# Patient Record
Sex: FEMALE | Race: WHITE | NOT HISPANIC OR LATINO | Employment: OTHER | URBAN - METROPOLITAN AREA
[De-identification: names, ages, dates, MRNs, and addresses within clinical notes are randomized per-mention and may not be internally consistent; named-entity substitution may affect disease eponyms.]

---

## 2017-01-05 ENCOUNTER — OFFICE VISIT (OUTPATIENT)
Dept: OBSTETRICS AND GYNECOLOGY | Facility: CLINIC | Age: 52
End: 2017-01-05
Payer: COMMERCIAL

## 2017-01-05 ENCOUNTER — TELEPHONE (OUTPATIENT)
Dept: OBSTETRICS AND GYNECOLOGY | Facility: CLINIC | Age: 52
End: 2017-01-05

## 2017-01-05 VITALS
DIASTOLIC BLOOD PRESSURE: 80 MMHG | SYSTOLIC BLOOD PRESSURE: 120 MMHG | RESPIRATION RATE: 10 BRPM | HEIGHT: 64 IN | BODY MASS INDEX: 29.02 KG/M2 | WEIGHT: 170 LBS | HEART RATE: 82 BPM

## 2017-01-05 DIAGNOSIS — Z01.419 WELL WOMAN EXAM WITH ROUTINE GYNECOLOGICAL EXAM: Primary | ICD-10-CM

## 2017-01-05 DIAGNOSIS — N95.1 MENOPAUSAL SYMPTOMS: ICD-10-CM

## 2017-01-05 DIAGNOSIS — Z12.31 OTHER SCREENING MAMMOGRAM: ICD-10-CM

## 2017-01-05 DIAGNOSIS — Z12.11 COLON CANCER SCREENING: ICD-10-CM

## 2017-01-05 DIAGNOSIS — Z12.4 CERVICAL CANCER SCREENING: ICD-10-CM

## 2017-01-05 PROCEDURE — 99999 PR PBB SHADOW E&M-EST. PATIENT-LVL III: CPT | Mod: PBBFAC,,, | Performed by: OBSTETRICS & GYNECOLOGY

## 2017-01-05 PROCEDURE — 88175 CYTOPATH C/V AUTO FLUID REDO: CPT

## 2017-01-05 PROCEDURE — 99396 PREV VISIT EST AGE 40-64: CPT | Mod: S$GLB,,, | Performed by: OBSTETRICS & GYNECOLOGY

## 2017-01-05 PROCEDURE — 87624 HPV HI-RISK TYP POOLED RSLT: CPT

## 2017-01-05 RX ORDER — ATENOLOL 25 MG/1
25 TABLET ORAL DAILY
COMMUNITY
End: 2019-02-11

## 2017-01-05 NOTE — TELEPHONE ENCOUNTER
----- Message from Izzy Chan MD sent at 1/5/2017  9:17 AM CST -----  Please fax MMG order to [facility].

## 2017-01-05 NOTE — MR AVS SNAPSHOT
"    [facility] [place] [facility]  [address] [place] [id number]  Phone: [phone]                  Coco Roger   2017 7:30 AM   Office Visit    Description:  Female : 1965   Provider:  Izzy Chan MD   Department:  [facility] [place] [facility]           Reason for Visit     Gynecologic Exam           Diagnoses this Visit        Comments    Well woman exam with routine gynecological exam    -  Primary     Cervical cancer screening         Menopausal symptoms         Colon cancer screening         Other screening mammogram                To Do List           Goals (5 Years of Data)     None      [facility]     [facility] -  Assistance  Registered nurses in the [facility] provide clinical advisement, health education, appointment booking, and other advisory services.  Call for this free service at [phone].             Medications           Message regarding Medications     Verify the changes and/or additions to your medication regime listed below are the same as discussed with your clinician today.  If any of these changes or additions are incorrect, please notify your healthcare provider.             Verify that the below list of medications is an accurate representation of the medications you are currently taking.  If none reported, the list may be blank. If incorrect, please contact your healthcare provider. Carry this list with you in case of emergency.           Current Medications     atenolol (TENORMIN) 25 MG tablet Take 25 mg by mouth once daily.    SOY ISOFLA/BLK COHOSH/MAG BARK (ESTROVEN ORAL) Take 1 tablet by mouth once daily.           Clinical Reference Information           Vital Signs - Last Recorded  Most recent update: 2017  7:40 AM by Erika Royal MA    BP Pulse Resp Ht Wt LMP    120/80 82 10 5' 4" (1.626 m) 77.1 kg (170 lb) 2016    BMI                29.18 kg/m2          Blood Pressure          Most Recent Value    BP "  120/80      Allergies as of 1/5/2017     Bactrim [Sulfamethoxazole-trimethoprim]    Epinephrine      Immunizations Administered on Date of Encounter - 1/5/2017     None      Orders Placed During Today's Visit      Normal Orders This Visit    Case request GI: COLONOSCOPY     HPV DNA probe, amplified     Liquid-based pap smear, screening     Mammo Digital Screening Bilat with CAD     Future Labs/Procedures Expected by Expires    Mammo Digital Screening Bilat with CAD  1/5/2017 3/7/2018

## 2017-01-05 NOTE — TELEPHONE ENCOUNTER
Mammogram order faxed to [facility] at [phone] with fax confirmation received.

## 2017-01-13 LAB — HUMAN PAPILLOMAVIRUS (HPV): NOT DETECTED

## 2017-03-07 ENCOUNTER — PATIENT MESSAGE (OUTPATIENT)
Dept: OBSTETRICS AND GYNECOLOGY | Facility: CLINIC | Age: 52
End: 2017-03-07

## 2017-03-08 ENCOUNTER — PATIENT MESSAGE (OUTPATIENT)
Dept: OBSTETRICS AND GYNECOLOGY | Facility: CLINIC | Age: 52
End: 2017-03-08

## 2017-03-08 RX ORDER — MEDROXYPROGESTERONE ACETATE 5 MG/1
5 TABLET ORAL DAILY
Qty: 30 TABLET | Refills: 11 | Status: SHIPPED | OUTPATIENT
Start: 2017-03-08 | End: 2018-01-22

## 2017-03-08 RX ORDER — ESTRADIOL 1 MG/1
1 TABLET ORAL DAILY
Qty: 30 TABLET | Refills: 11 | Status: SHIPPED | OUTPATIENT
Start: 2017-03-08 | End: 2017-03-29

## 2017-03-22 ENCOUNTER — PATIENT MESSAGE (OUTPATIENT)
Dept: OBSTETRICS AND GYNECOLOGY | Facility: CLINIC | Age: 52
End: 2017-03-22

## 2017-03-24 ENCOUNTER — PATIENT MESSAGE (OUTPATIENT)
Dept: OBSTETRICS AND GYNECOLOGY | Facility: CLINIC | Age: 52
End: 2017-03-24

## 2017-03-29 ENCOUNTER — PATIENT MESSAGE (OUTPATIENT)
Dept: OBSTETRICS AND GYNECOLOGY | Facility: CLINIC | Age: 52
End: 2017-03-29

## 2017-03-29 RX ORDER — ESTRADIOL 2 MG/1
2 TABLET ORAL DAILY
Qty: 30 TABLET | Refills: 11 | Status: SHIPPED | OUTPATIENT
Start: 2017-03-29 | End: 2018-01-22 | Stop reason: SDUPTHER

## 2017-03-30 ENCOUNTER — PATIENT MESSAGE (OUTPATIENT)
Dept: OBSTETRICS AND GYNECOLOGY | Facility: CLINIC | Age: 52
End: 2017-03-30

## 2017-05-16 ENCOUNTER — PATIENT MESSAGE (OUTPATIENT)
Dept: OBSTETRICS AND GYNECOLOGY | Facility: CLINIC | Age: 52
End: 2017-05-16

## 2018-01-22 ENCOUNTER — OFFICE VISIT (OUTPATIENT)
Dept: OBSTETRICS AND GYNECOLOGY | Facility: CLINIC | Age: 53
End: 2018-01-22
Payer: COMMERCIAL

## 2018-01-22 VITALS
DIASTOLIC BLOOD PRESSURE: 80 MMHG | RESPIRATION RATE: 10 BRPM | SYSTOLIC BLOOD PRESSURE: 120 MMHG | WEIGHT: 166 LBS | HEART RATE: 88 BPM | BODY MASS INDEX: 28.34 KG/M2 | HEIGHT: 64 IN

## 2018-01-22 DIAGNOSIS — Z98.890 HISTORY OF ENDOMETRIAL ABLATION: ICD-10-CM

## 2018-01-22 DIAGNOSIS — Z79.890 HORMONE REPLACEMENT THERAPY (HRT): ICD-10-CM

## 2018-01-22 DIAGNOSIS — Z01.419 WELL WOMAN EXAM WITH ROUTINE GYNECOLOGICAL EXAM: Primary | ICD-10-CM

## 2018-01-22 DIAGNOSIS — Z12.39 SCREENING FOR BREAST CANCER: ICD-10-CM

## 2018-01-22 PROCEDURE — 99396 PREV VISIT EST AGE 40-64: CPT | Mod: S$GLB,,, | Performed by: OBSTETRICS & GYNECOLOGY

## 2018-01-22 PROCEDURE — 99999 PR PBB SHADOW E&M-EST. PATIENT-LVL III: CPT | Mod: PBBFAC,,, | Performed by: OBSTETRICS & GYNECOLOGY

## 2018-01-22 RX ORDER — MEDROXYPROGESTERONE ACETATE 2.5 MG/1
2.5 TABLET ORAL DAILY
Qty: 30 TABLET | Refills: 11 | Status: SHIPPED | OUTPATIENT
Start: 2018-01-22 | End: 2019-01-18 | Stop reason: SDUPTHER

## 2018-01-22 RX ORDER — ESTRADIOL 2 MG/1
2 TABLET ORAL DAILY
Qty: 30 TABLET | Refills: 11 | Status: SHIPPED | OUTPATIENT
Start: 2018-01-22 | End: 2019-01-08 | Stop reason: SDUPTHER

## 2018-01-22 NOTE — PROGRESS NOTES
Subjective:    Patient ID: Coco Roger is a 52 y.o. y.o. female.     Chief Complaint: Annual Well Woman Exam     History of Present Illness:  Coco presents today for Annual Well Woman exam. She describes her menses as regular every month without intermenstrual spotting.She denies pelvic pain.  She denies breast tenderness, masses, nipple discharge. She denies difficulty with urination or bowel movements. She denies menopausal symptoms such as hotflashes, vaginal dryness, and night sweats. She denies bloating, early satiety, or weight changes. She is sexually active. Contraception is by no method.    Vasomotor symptoms resolved with HRT however has bleeding while on her 10 days of provera. Has tried taking it every day with no improvement.     Menstrual History:   No LMP recorded. Patient is not currently having periods (Reason: Perimenopausal)..     OB History      Para Term  AB Living    4 3 3   1 3    SAB TAB Ectopic Multiple Live Births    1       3          The following portions of the patient's history were reviewed and updated as appropriate: allergies, current medications, past family history, past medical history, past social history, past surgical history and problem list.    ROS:   CONSTITUTIONAL: Negative for fever, chills, diaphoresis, weakness, fatigue, weight loss, weight gain  ENT: negative for sore throat, nasal congestion, nasal discharge, epistaxis, tinnitus, hearing loss  EYES: negative for blurry vision, decreased vision, loss of vision, eye pain, diplopia, photophobia, discharge  SKIN: Negative for rash, itching, hives  RESPIRATORY: negative for cough, hemoptysis, shortness of breath, pleuritic chest pain, wheezing  CARDIOVASCULAR: negative for chest pain, dyspnea on exertion, orthopnea, paroxysmal nocturnal dyspnea, edema, palpitations  BREAST: negative for breast  tenderness, breast mass, nipple discharge, or skin changes  GASTROINTESTINAL: negative for abdominal pain, flank  "pain, nausea, vomiting, diarrhea, constipation, black stool, blood in stool  GENITOURINARY: negative for abnormal vaginal bleeding, amenorrhea, decreased libido, dysuria, genital sores, hematuria, incontinence, menorrhagia, pelvic pain, urinary frequency, vaginal discharge  HEMATOLOGIC/LYMPHATIC: negative for swollen lymph nodes, bleeding, bruising  MUSCULOSKELETAL: negative for back pain, joint pain, joint stiffness, joint swelling, muscle pain, muscle weakness  NEUROLOGICAL: negative for dizzy/vertigo, headache, focal weakness, numbness/tingling, speech problems, loss of consciousness, confusion, memory loss  BEHAVORIAL/PSYCH: negative for anxiety, depression, psychosis  ENDOCRINE: negative for polydipsia/polyuria, palpitations, skin changes, temperature intolerance, unexpected weight changes  ALLERGIC/IMMUNOLOGIC: negative for urticaria, hay fever, angioedema      Objective:    Vital Signs:  Vitals:    01/22/18 1337   BP: 120/80   Pulse: 88   Resp: 10   Weight: 75.3 kg (166 lb)   Height: 5' 4" (1.626 m)         Physical Exam:  General:  alert, cooperative, appears stated age   Skin:  Skin color, texture, turgor normal. No rashes or lesions   HEENT:  conjunctivae/corneas clear. PERRL.   Neck: supple, trachea midline, no adenopathy or thyromegally   Respiratory:  clear to auscultation bilaterally   Heart:  regular rate and rhythm, S1, S2 normal, no murmur, click, rub or gallop   Breasts:  no discharge, erythema, or tenderness   Abdomen:  normal findings: bowel sounds normal, no masses palpable, no organomegaly and soft, non-tender   Pelvis: External genitalia: normal general appearance  Urinary system: urethral meatus normal, bladder nontender  Vaginal: normal mucosa without prolapse or lesions  Cervix: normal appearance  Uterus: normal size, shape, position  Adnexa: normal size, nontender bilaterally   Extremities: Normal ROM; no edema, no cyanosis   Neurologial: Normal strength and tone. No focal numbness or " weakness. Reflexes 2+ and equal.   Psychiatric: normal mood, speech, dress, and thought processes         Assessment:       Healthy female exam.     1. Well woman exam with routine gynecological exam    2. Hormone replacement therapy (HRT)    3. History of endometrial ablation    4. Screening for breast cancer          Plan:      Pap smear deferred  MMG ordered  Refill Estrace  Change Provera from 5 mg to 2.5 mg  RTC in 1 year    COUNSELING:  Coco was counseled on STD pevention, use and side-effects of various contraceptive measures, A.C.O.G. Pap guidelines and recommendations for yearly pelvic exams in addition to recommendations for monthly self breast exams; to see her PCP for other health maintenance.

## 2019-01-07 RX ORDER — MEDROXYPROGESTERONE ACETATE 2.5 MG/1
TABLET ORAL
Qty: 90 TABLET | Refills: 0 | OUTPATIENT
Start: 2019-01-07

## 2019-01-08 RX ORDER — ESTRADIOL 2 MG/1
2 TABLET ORAL DAILY
Qty: 30 TABLET | Refills: 11 | Status: SHIPPED | OUTPATIENT
Start: 2019-01-08 | End: 2019-02-11 | Stop reason: SDUPTHER

## 2019-01-08 NOTE — TELEPHONE ENCOUNTER
Refill request received from [facility] in [place] for refill on Estradiol 2 mg tablets. Last annual 1/22/18, patient is scheduled for annual on 2/11/19.

## 2019-01-18 ENCOUNTER — PATIENT MESSAGE (OUTPATIENT)
Dept: OBSTETRICS AND GYNECOLOGY | Facility: CLINIC | Age: 54
End: 2019-01-18

## 2019-01-18 RX ORDER — MEDROXYPROGESTERONE ACETATE 2.5 MG/1
2.5 TABLET ORAL DAILY
Qty: 30 TABLET | Refills: 1 | Status: SHIPPED | OUTPATIENT
Start: 2019-01-18 | End: 2019-02-11 | Stop reason: SDUPTHER

## 2019-01-18 NOTE — TELEPHONE ENCOUNTER
Coco desire refill of Provera 2.5mg Please advise. wwe 2/11/19.  There is no problem list on file for this patient.    Prior to Admission medications    Medication Sig Start Date End Date Taking? Authorizing Provider   atenolol (TENORMIN) 25 MG tablet Take 25 mg by mouth once daily.    Historical Provider, MD   estradiol (ESTRACE) 2 MG tablet Take 1 tablet (2 mg total) by mouth once daily. 1/8/19 1/8/20  Izzy Chan MD   medroxyPROGESTERone (PROVERA) 2.5 MG tablet Take 1 tablet (2.5 mg total) by mouth once daily. 1/22/18 1/22/19  Izzy Chan MD

## 2019-02-11 ENCOUNTER — OFFICE VISIT (OUTPATIENT)
Dept: OBSTETRICS AND GYNECOLOGY | Facility: CLINIC | Age: 54
End: 2019-02-11
Payer: COMMERCIAL

## 2019-02-11 VITALS
BODY MASS INDEX: 27.89 KG/M2 | DIASTOLIC BLOOD PRESSURE: 94 MMHG | WEIGHT: 163.38 LBS | HEIGHT: 64 IN | HEART RATE: 71 BPM | SYSTOLIC BLOOD PRESSURE: 142 MMHG | RESPIRATION RATE: 14 BRPM

## 2019-02-11 DIAGNOSIS — N95.1 MENOPAUSAL SYMPTOMS: ICD-10-CM

## 2019-02-11 DIAGNOSIS — Z12.4 CERVICAL CANCER SCREENING: ICD-10-CM

## 2019-02-11 DIAGNOSIS — Z12.31 SCREENING MAMMOGRAM, ENCOUNTER FOR: ICD-10-CM

## 2019-02-11 DIAGNOSIS — Z01.419 ENCOUNTER FOR GYNECOLOGICAL EXAMINATION (GENERAL) (ROUTINE) WITHOUT ABNORMAL FINDINGS: Primary | ICD-10-CM

## 2019-02-11 PROCEDURE — 88175 CYTOPATH C/V AUTO FLUID REDO: CPT

## 2019-02-11 PROCEDURE — 99396 PREV VISIT EST AGE 40-64: CPT | Mod: S$GLB,,, | Performed by: OBSTETRICS & GYNECOLOGY

## 2019-02-11 PROCEDURE — 99999 PR PBB SHADOW E&M-EST. PATIENT-LVL III: CPT | Mod: PBBFAC,,, | Performed by: OBSTETRICS & GYNECOLOGY

## 2019-02-11 PROCEDURE — 99999 PR PBB SHADOW E&M-EST. PATIENT-LVL III: ICD-10-PCS | Mod: PBBFAC,,, | Performed by: OBSTETRICS & GYNECOLOGY

## 2019-02-11 PROCEDURE — 99396 PR PREVENTIVE VISIT,EST,40-64: ICD-10-PCS | Mod: S$GLB,,, | Performed by: OBSTETRICS & GYNECOLOGY

## 2019-02-11 RX ORDER — AMOXICILLIN 500 MG
CAPSULE ORAL DAILY
COMMUNITY
End: 2022-01-20

## 2019-02-11 RX ORDER — ACETAMINOPHEN AND PHENYLEPHRINE HCL 325; 5 MG/1; MG/1
TABLET ORAL
COMMUNITY

## 2019-02-11 RX ORDER — ESTRADIOL 2 MG/1
2 TABLET ORAL DAILY
Qty: 90 TABLET | Refills: 3 | Status: SHIPPED | OUTPATIENT
Start: 2019-02-11 | End: 2020-01-21 | Stop reason: SDUPTHER

## 2019-02-11 RX ORDER — MEDROXYPROGESTERONE ACETATE 2.5 MG/1
2.5 TABLET ORAL DAILY
Qty: 90 TABLET | Refills: 3 | Status: SHIPPED | OUTPATIENT
Start: 2019-02-11 | End: 2020-01-21 | Stop reason: SDUPTHER

## 2019-02-11 RX ORDER — ATENOLOL 50 MG/1
75 TABLET ORAL
Refills: 3 | COMMUNITY
Start: 2019-01-28

## 2019-02-11 NOTE — PROGRESS NOTES
Subjective:    Patient ID: Coco Roger is a 53 y.o. y.o. female.     Chief Complaint: Annual Well Woman Exam     History of Present Illness:  Coco presents today for Annual Well Woman exam. She describes her menses as absent as of Oct 2018.She denies pelvic pain.  She denies breast tenderness, masses, nipple discharge. She denies difficulty with urination or bowel movements. She reports menopausal symptoms such as hotflashes, vaginal dryness, and night sweats are well controlled with current HRT dose. She denies bloating, early satiety, or weight changes. She is sexually active. Contraception is by bilateral tubal ligation.      Menstrual History:   No LMP recorded. Patient is perimenopausal..     OB History      Para Term  AB Living    4 3 3   1 3    SAB TAB Ectopic Multiple Live Births    1       3          The following portions of the patient's history were reviewed and updated as appropriate: allergies, current medications, past family history, past medical history, past social history, past surgical history and problem list.    ROS:   CONSTITUTIONAL: Negative for fever, chills, diaphoresis, weakness, fatigue, weight loss, weight gain  ENT: negative for sore throat, nasal congestion, nasal discharge, epistaxis, tinnitus, hearing loss  EYES: negative for blurry vision, decreased vision, loss of vision, eye pain, diplopia, photophobia, discharge  SKIN: Negative for rash, itching, hives  RESPIRATORY: negative for cough, hemoptysis, shortness of breath, pleuritic chest pain, wheezing  CARDIOVASCULAR: negative for chest pain, dyspnea on exertion, orthopnea, paroxysmal nocturnal dyspnea, edema, palpitations  BREAST: negative for breast  tenderness, breast mass, nipple discharge, or skin changes  GASTROINTESTINAL: negative for abdominal pain, flank pain, nausea, vomiting, diarrhea, constipation, black stool, blood in stool  GENITOURINARY: negative for abnormal vaginal bleeding, amenorrhea, decreased  libido, dysuria, genital sores, hematuria, incontinence, menorrhagia, pelvic pain, urinary frequency, vaginal discharge  HEMATOLOGIC/LYMPHATIC: negative for swollen lymph nodes, bleeding, bruising  MUSCULOSKELETAL: negative for back pain, joint pain, joint stiffness, joint swelling, muscle pain, muscle weakness  NEUROLOGICAL: negative for dizzy/vertigo, headache, focal weakness, numbness/tingling, speech problems, loss of consciousness, confusion, memory loss  BEHAVORIAL/PSYCH: negative for anxiety, depression, psychosis  ENDOCRINE: hair loss, negative for polydipsia/polyuria, palpitations, skin changes, temperature intolerance, unexpected weight changes      Objective:    Vital Signs:  Vitals:    02/11/19 1212   BP: (!) 142/94   Pulse: 71   Resp: 14       Physical Exam:  General:  alert, cooperative, no distress   Skin:  Skin color, texture, turgor normal. No rashes or lesions   HEENT:  extra ocular movement intact, sclera clear, anicteric   Neck: supple, trachea midline, no adenopathy or thyromegally   Respiratory:  Normal effort   Breasts:  no discharge, erythema, tenderness, or palpable masses; no axillary lymphadenopathy   Abdomen:  soft, nontender, no palpable masses   Pelvis: External genitalia: normal general appearance  Urinary system: urethral meatus normal, bladder nontender  Vaginal: normal mucosa without prolapse or lesions  Cervix: normal appearance  Uterus: normal size, shape, position  Adnexa: normal size, nontender bilaterally   Extremities: Normal ROM; no edema, no cyanosis   Neurologial: Normal strength and tone. No focal numbness or weakness.   Psychiatric: normal mood, speech, dress, and thought processes         Assessment:       Healthy female exam.     1. Encounter for gynecological examination (general) (routine) without abnormal findings    2. Menopausal symptoms    3. Cervical cancer screening    4. Screening mammogram, encounter for          Plan:      Encounter for gynecological  examination (general) (routine) without abnormal findings    Menopausal symptoms  -     estradiol (ESTRACE) 2 MG tablet; Take 1 tablet (2 mg total) by mouth once daily.  Dispense: 90 tablet; Refill: 3  -     medroxyPROGESTERone (PROVERA) 2.5 MG tablet; Take 1 tablet (2.5 mg total) by mouth once daily.  Dispense: 90 tablet; Refill: 3    Cervical cancer screening  -     Liquid-based pap smear, screening    Screening mammogram, encounter for  -     Mammo Digital Screening Bilat w/ Dago; Future; Expected date: 02/11/2019        Patient's insurance will only cover annual examination if pap smear is done with GYN exam.  Discussed ASCCP guidelines.  Will do pap smear today per discussion with patient and per insurance rules and regulations despite guidelines.    COUNSELING:  Coco was counseled on A.C.O.G. Pap guidelines and recommendations for yearly pelvic exams in addition to recommendations for monthly self breast exams; to see her PCP for other health maintenance.

## 2020-01-21 DIAGNOSIS — Z12.31 BREAST CANCER SCREENING BY MAMMOGRAM: Primary | ICD-10-CM

## 2020-01-21 DIAGNOSIS — N95.1 MENOPAUSAL SYMPTOMS: ICD-10-CM

## 2020-01-21 NOTE — TELEPHONE ENCOUNTER
----- Message from Erika Royal MA sent at 1/21/2020 11:37 AM CST -----  Contact: self      ----- Message -----  From: Radha Greenfield MA  Sent: 1/21/2020  11:21 AM CST  To: Dustin Magana Staff    Coco Roger  MRN: [id number]  Home Phone      [phone]  Work Phone      Not on file.  Mobile          [phone]    Patient Care Team:  Tacho Paul MD as PCP - General (Internal Medicine)  Izzy Chan MD (Obstetrics and Gynecology)  OB? No  What phone number can you be reached at?  [phone]  Message: Needs refill on hormone medication.  Annual is scheduled for 04/03/20. Also needs mammo orders sent to [facility].     Pharmacy:  [facility] on [address]

## 2020-01-21 NOTE — TELEPHONE ENCOUNTER
Coco desire refill of Estrace and Provera to last until scheduled annual on 4/3/20. Last annual 2/11/19. Mammogram orders placed and faxed to [facility] with fax confirmation.  Patient Active Problem List   Diagnosis    Perimenopausal vasomotor symptoms     Prior to Admission medications    Medication Sig Start Date End Date Taking? Authorizing Provider   atenolol (TENORMIN) 50 MG tablet TK 1 T PO  QD 1/28/19   Historical Provider, MD   biotin 10,000 mcg Cap Take by mouth.    Historical Provider, MD   estradiol (ESTRACE) 2 MG tablet Take 1 tablet (2 mg total) by mouth once daily. 2/11/19 2/11/20  Ashlee Nino MD   fish oil-omega-3 fatty acids 300-1,000 mg capsule Take by mouth once daily.    Historical Provider, MD   medroxyPROGESTERone (PROVERA) 2.5 MG tablet Take 1 tablet (2.5 mg total) by mouth once daily. 2/11/19 2/11/20  Ashlee Nino MD   mv-mn/folic acid/calcium/vit K (ONE-A-DAY WOMEN'S 50 PLUS ORAL) Take by mouth.    Historical Provider, MD

## 2020-01-22 RX ORDER — ESTRADIOL 2 MG/1
2 TABLET ORAL DAILY
Qty: 90 TABLET | Refills: 0 | Status: SHIPPED | OUTPATIENT
Start: 2020-01-22 | End: 2020-02-04

## 2020-01-22 RX ORDER — MEDROXYPROGESTERONE ACETATE 2.5 MG/1
2.5 TABLET ORAL DAILY
Qty: 90 TABLET | Refills: 0 | Status: SHIPPED | OUTPATIENT
Start: 2020-01-22 | End: 2020-02-04

## 2020-02-02 DIAGNOSIS — N95.1 MENOPAUSAL SYMPTOMS: ICD-10-CM

## 2020-02-04 RX ORDER — MEDROXYPROGESTERONE ACETATE 2.5 MG/1
TABLET ORAL
Qty: 90 TABLET | Refills: 0 | Status: SHIPPED | OUTPATIENT
Start: 2020-02-04 | End: 2020-07-07 | Stop reason: SDUPTHER

## 2020-02-04 RX ORDER — ESTRADIOL 2 MG/1
TABLET ORAL
Qty: 90 TABLET | Refills: 0 | Status: SHIPPED | OUTPATIENT
Start: 2020-02-04 | End: 2020-07-07 | Stop reason: SDUPTHER

## 2020-07-07 ENCOUNTER — OFFICE VISIT (OUTPATIENT)
Dept: OBSTETRICS AND GYNECOLOGY | Facility: CLINIC | Age: 55
End: 2020-07-07
Payer: COMMERCIAL

## 2020-07-07 VITALS — HEART RATE: 66 BPM | BODY MASS INDEX: 28.62 KG/M2 | WEIGHT: 167.63 LBS | RESPIRATION RATE: 16 BRPM | HEIGHT: 64 IN

## 2020-07-07 DIAGNOSIS — Z79.890 HORMONE REPLACEMENT THERAPY (HRT): ICD-10-CM

## 2020-07-07 DIAGNOSIS — Z01.419 ENCOUNTER FOR GYNECOLOGICAL EXAMINATION (GENERAL) (ROUTINE) WITHOUT ABNORMAL FINDINGS: Primary | ICD-10-CM

## 2020-07-07 DIAGNOSIS — Z12.39 SCREENING FOR BREAST CANCER: ICD-10-CM

## 2020-07-07 DIAGNOSIS — N95.1 MENOPAUSAL SYMPTOMS: ICD-10-CM

## 2020-07-07 DIAGNOSIS — Z12.4 CERVICAL CANCER SCREENING: ICD-10-CM

## 2020-07-07 PROCEDURE — 99999 PR PBB SHADOW E&M-EST. PATIENT-LVL III: CPT | Mod: PBBFAC,,, | Performed by: OBSTETRICS & GYNECOLOGY

## 2020-07-07 PROCEDURE — 87624 HPV HI-RISK TYP POOLED RSLT: CPT

## 2020-07-07 PROCEDURE — 99396 PR PREVENTIVE VISIT,EST,40-64: ICD-10-PCS | Mod: S$GLB,,, | Performed by: OBSTETRICS & GYNECOLOGY

## 2020-07-07 PROCEDURE — 99999 PR PBB SHADOW E&M-EST. PATIENT-LVL III: ICD-10-PCS | Mod: PBBFAC,,, | Performed by: OBSTETRICS & GYNECOLOGY

## 2020-07-07 PROCEDURE — 99396 PREV VISIT EST AGE 40-64: CPT | Mod: S$GLB,,, | Performed by: OBSTETRICS & GYNECOLOGY

## 2020-07-07 PROCEDURE — 88175 CYTOPATH C/V AUTO FLUID REDO: CPT

## 2020-07-07 RX ORDER — MEDROXYPROGESTERONE ACETATE 2.5 MG/1
TABLET ORAL
Qty: 90 TABLET | Refills: 3 | Status: SHIPPED | OUTPATIENT
Start: 2020-07-07 | End: 2021-01-27

## 2020-07-07 RX ORDER — ESTRADIOL 2 MG/1
TABLET ORAL
Qty: 90 TABLET | Refills: 3 | Status: SHIPPED | OUTPATIENT
Start: 2020-07-07 | End: 2021-07-27

## 2020-07-07 NOTE — PROGRESS NOTES
"  Subjective:    Patient ID: Coco Roger is a 55 y.o. y.o. female.     Chief Complaint: Annual Well Woman Exam     History of Present Illness:  Coco presents today for Annual Well Woman exam. She describes her menses as absent.She denies pelvic pain.  She denies breast tenderness, masses, nipple discharge. She denies difficulty with urination or bowel movements. She denies menopausal symptoms such as hotflashes, vaginal dryness, and night sweats; reports HRT working well. She denies bloating, early satiety, or weight changes. She is sexually active. Contraception is by no method.    The following portions of the patient's history were reviewed and updated as appropriate: allergies, current medications, past family history, past medical history, past social history, past surgical history and problem list.      Menstrual History:   No LMP recorded. Patient is perimenopausal..     OB History        4    Para   3    Term   3            AB   1    Living   3       SAB   1    TAB        Ectopic        Multiple        Live Births   3                 The following portions of the patient's history were reviewed and updated as appropriate: allergies, current medications, past family history, past medical history, past social history, past surgical history and problem list.        ROS:     Review of Systems    Objective:    Vital Signs:  Vitals:    20 0910   Pulse: 66   Resp: 16   Weight: 76 kg (167 lb 9.6 oz)   Height: 5' 4" (1.626 m)         Physical Exam:  General:  alert, cooperative, appears stated age   Skin:  Skin color, texture, turgor normal. No rashes or lesions   HEENT:  conjunctivae/corneas clear. PERRL.   Neck: supple, trachea midline, no adenopathy or thyromegally   Respiratory:  clear to auscultation bilaterally   Heart:  regular rate and rhythm, S1, S2 normal, no murmur, click, rub or gallop   Breasts:  no discharge, erythema, or tenderness   Abdomen:  normal findings: bowel sounds normal, " no masses palpable, no organomegaly and soft, non-tender   Pelvis: External genitalia: normal general appearance  Urinary system: urethral meatus normal, bladder nontender  Vaginal: normal mucosa without prolapse or lesions  Cervix: normal appearance  Uterus: normal size, shape, position  Adnexa: normal size, nontender bilaterally   Extremities: Normal ROM; no edema, no cyanosis   Neurologial: Normal strength and tone. No focal numbness or weakness. Reflexes 2+ and equal.   Psychiatric: normal mood, speech, dress, and thought processes         Assessment:       Healthy female exam.     1. Encounter for gynecological examination (general) (routine) without abnormal findings    2. Cervical cancer screening    3. Screening for breast cancer    4. Hormone replacement therapy (HRT)    5. Menopausal symptoms          Plan:       Thin prep Pap smear.    HPV cotesting  MMG up to date  Refill HRT  Discussed follow up with PCP for BP control  RTC in 1 year    COUNSELING:  Coco was counseled on STD pevention, use and side-effects of various contraceptive measures, A.C.O.G. Pap guidelines and recommendations for yearly pelvic exams in addition to recommendations for monthly self breast exams; to see her PCP for other health maintenance.

## 2020-07-13 LAB
FINAL PATHOLOGIC DIAGNOSIS: NORMAL
HPV HR 12 DNA SPEC QL NAA+PROBE: POSITIVE
HPV16 AG SPEC QL: NEGATIVE
HPV18 DNA SPEC QL NAA+PROBE: NEGATIVE
Lab: NORMAL

## 2021-05-06 ENCOUNTER — PATIENT MESSAGE (OUTPATIENT)
Dept: RESEARCH | Facility: HOSPITAL | Age: 56
End: 2021-05-06

## 2021-07-05 ENCOUNTER — IMMUNIZATION (OUTPATIENT)
Dept: PRIMARY CARE CLINIC | Facility: CLINIC | Age: 56
End: 2021-07-05

## 2021-07-05 DIAGNOSIS — Z23 NEED FOR VACCINATION: Primary | ICD-10-CM

## 2021-07-05 PROCEDURE — 0001A COVID-19, MRNA, LNP-S, PF, 30 MCG/0.3 ML DOSE VACCINE: CPT | Mod: CV19,S$GLB,, | Performed by: INTERNAL MEDICINE

## 2021-07-05 PROCEDURE — 91300 COVID-19, MRNA, LNP-S, PF, 30 MCG/0.3 ML DOSE VACCINE: ICD-10-PCS | Mod: S$GLB,,, | Performed by: INTERNAL MEDICINE

## 2021-07-05 PROCEDURE — 91300 COVID-19, MRNA, LNP-S, PF, 30 MCG/0.3 ML DOSE VACCINE: CPT | Mod: S$GLB,,, | Performed by: INTERNAL MEDICINE

## 2021-07-05 PROCEDURE — 0001A COVID-19, MRNA, LNP-S, PF, 30 MCG/0.3 ML DOSE VACCINE: ICD-10-PCS | Mod: CV19,S$GLB,, | Performed by: INTERNAL MEDICINE

## 2021-07-26 DIAGNOSIS — N95.1 MENOPAUSAL SYMPTOMS: ICD-10-CM

## 2021-07-26 DIAGNOSIS — Z79.890 HORMONE REPLACEMENT THERAPY (HRT): ICD-10-CM

## 2021-07-27 RX ORDER — ESTRADIOL 2 MG/1
TABLET ORAL
Qty: 90 TABLET | Refills: 0 | Status: SHIPPED | OUTPATIENT
Start: 2021-07-27 | End: 2021-10-26

## 2021-11-11 ENCOUNTER — PATIENT MESSAGE (OUTPATIENT)
Dept: OBSTETRICS AND GYNECOLOGY | Facility: CLINIC | Age: 56
End: 2021-11-11
Payer: COMMERCIAL

## 2021-11-11 DIAGNOSIS — N95.1 MENOPAUSAL SYMPTOMS: ICD-10-CM

## 2021-11-11 DIAGNOSIS — Z79.890 HORMONE REPLACEMENT THERAPY (HRT): ICD-10-CM

## 2021-11-12 RX ORDER — ESTRADIOL 2 MG/1
TABLET ORAL
Qty: 90 TABLET | Refills: 0 | Status: SHIPPED | OUTPATIENT
Start: 2021-11-12 | End: 2022-01-20 | Stop reason: SDUPTHER

## 2021-11-12 RX ORDER — MEDROXYPROGESTERONE ACETATE 2.5 MG/1
2.5 TABLET ORAL DAILY
Qty: 90 TABLET | Refills: 0 | Status: SHIPPED | OUTPATIENT
Start: 2021-11-12 | End: 2022-01-20 | Stop reason: SDUPTHER

## 2022-01-20 ENCOUNTER — OFFICE VISIT (OUTPATIENT)
Dept: OBSTETRICS AND GYNECOLOGY | Facility: CLINIC | Age: 57
End: 2022-01-20
Payer: COMMERCIAL

## 2022-01-20 VITALS
DIASTOLIC BLOOD PRESSURE: 82 MMHG | BODY MASS INDEX: 27.11 KG/M2 | HEIGHT: 64 IN | RESPIRATION RATE: 15 BRPM | SYSTOLIC BLOOD PRESSURE: 140 MMHG | HEART RATE: 91 BPM | WEIGHT: 158.81 LBS

## 2022-01-20 DIAGNOSIS — Z12.4 CERVICAL CANCER SCREENING: ICD-10-CM

## 2022-01-20 DIAGNOSIS — Z79.890 HORMONE REPLACEMENT THERAPY (HRT): ICD-10-CM

## 2022-01-20 DIAGNOSIS — Z12.31 ENCOUNTER FOR SCREENING MAMMOGRAM FOR MALIGNANT NEOPLASM OF BREAST: ICD-10-CM

## 2022-01-20 DIAGNOSIS — N95.1 MENOPAUSAL SYMPTOMS: ICD-10-CM

## 2022-01-20 DIAGNOSIS — Z01.419 WELL WOMAN EXAM WITH ROUTINE GYNECOLOGICAL EXAM: Primary | ICD-10-CM

## 2022-01-20 PROCEDURE — 87624 HPV HI-RISK TYP POOLED RSLT: CPT | Performed by: OBSTETRICS & GYNECOLOGY

## 2022-01-20 PROCEDURE — 99396 PREV VISIT EST AGE 40-64: CPT | Mod: S$GLB,,, | Performed by: OBSTETRICS & GYNECOLOGY

## 2022-01-20 PROCEDURE — 99999 PR PBB SHADOW E&M-EST. PATIENT-LVL III: CPT | Mod: PBBFAC,,, | Performed by: OBSTETRICS & GYNECOLOGY

## 2022-01-20 PROCEDURE — 3079F DIAST BP 80-89 MM HG: CPT | Mod: CPTII,S$GLB,, | Performed by: OBSTETRICS & GYNECOLOGY

## 2022-01-20 PROCEDURE — 99999 PR PBB SHADOW E&M-EST. PATIENT-LVL III: ICD-10-PCS | Mod: PBBFAC,,, | Performed by: OBSTETRICS & GYNECOLOGY

## 2022-01-20 PROCEDURE — 88175 CYTOPATH C/V AUTO FLUID REDO: CPT | Performed by: OBSTETRICS & GYNECOLOGY

## 2022-01-20 PROCEDURE — 3077F SYST BP >= 140 MM HG: CPT | Mod: CPTII,S$GLB,, | Performed by: OBSTETRICS & GYNECOLOGY

## 2022-01-20 PROCEDURE — 3008F BODY MASS INDEX DOCD: CPT | Mod: CPTII,S$GLB,, | Performed by: OBSTETRICS & GYNECOLOGY

## 2022-01-20 PROCEDURE — 3077F PR MOST RECENT SYSTOLIC BLOOD PRESSURE >= 140 MM HG: ICD-10-PCS | Mod: CPTII,S$GLB,, | Performed by: OBSTETRICS & GYNECOLOGY

## 2022-01-20 PROCEDURE — 99396 PR PREVENTIVE VISIT,EST,40-64: ICD-10-PCS | Mod: S$GLB,,, | Performed by: OBSTETRICS & GYNECOLOGY

## 2022-01-20 PROCEDURE — 3079F PR MOST RECENT DIASTOLIC BLOOD PRESSURE 80-89 MM HG: ICD-10-PCS | Mod: CPTII,S$GLB,, | Performed by: OBSTETRICS & GYNECOLOGY

## 2022-01-20 PROCEDURE — 3008F PR BODY MASS INDEX (BMI) DOCUMENTED: ICD-10-PCS | Mod: CPTII,S$GLB,, | Performed by: OBSTETRICS & GYNECOLOGY

## 2022-01-20 RX ORDER — ESTRADIOL 2 MG/1
TABLET ORAL
Qty: 90 TABLET | Refills: 3 | Status: SHIPPED | OUTPATIENT
Start: 2022-01-20 | End: 2022-01-25

## 2022-01-20 RX ORDER — HYDROCHLOROTHIAZIDE 25 MG/1
25 TABLET ORAL DAILY
Qty: 90 TABLET | Refills: 3 | Status: SHIPPED | OUTPATIENT
Start: 2022-01-20

## 2022-01-20 RX ORDER — HYDROCHLOROTHIAZIDE 25 MG/1
25 TABLET ORAL DAILY
COMMUNITY
End: 2022-01-20 | Stop reason: SDUPTHER

## 2022-01-20 RX ORDER — MEDROXYPROGESTERONE ACETATE 2.5 MG/1
2.5 TABLET ORAL DAILY
Qty: 90 TABLET | Refills: 3 | Status: SHIPPED | OUTPATIENT
Start: 2022-01-20 | End: 2022-01-25

## 2022-01-20 NOTE — PROGRESS NOTES
Subjective:    Patient ID: Coco Roger is a 56 y.o. y.o. female.     Chief Complaint: Annual Well Woman Exam     History of Present Illness:  Coco presents today for Annual Well Woman exam. She describes her menses as absent.She denies pelvic pain.  She denies breast tenderness, masses, nipple discharge. She denies difficulty with urination or bowel movements. She denies menopausal symptoms such as hotflashes, vaginal dryness, and night sweats; reports HRT working well. She denies bloating, early satiety, or weight changes. She is sexually active. Contraception is by no method.    The following portions of the patient's history were reviewed and updated as appropriate: allergies, current medications, past family history, past medical history, past social history, past surgical history and problem list.      Menstrual History:   Patient's last menstrual period was 2016..     OB History        4    Para   3    Term   3            AB   1    Living   3       SAB   1    IAB        Ectopic        Multiple        Live Births   3                 The following portions of the patient's history were reviewed and updated as appropriate: allergies, current medications, past family history, past medical history, past social history, past surgical history and problem list.        ROS:     Review of Systems   Constitutional: Negative for activity change, appetite change, chills, diaphoresis, fatigue, fever and unexpected weight change.   HENT: Negative for mouth sores and tinnitus.    Eyes: Negative for discharge and visual disturbance.   Respiratory: Negative for cough, shortness of breath and wheezing.    Cardiovascular: Negative for chest pain, palpitations and leg swelling.   Gastrointestinal: Negative for abdominal pain, bloating, blood in stool, constipation, diarrhea, nausea and vomiting.   Endocrine: Negative for diabetes, hair loss, hot flashes, hyperthyroidism and hypothyroidism.   Genitourinary:  "Negative for dysuria, flank pain, frequency, genital sores, hematuria, urgency, vaginal bleeding, vaginal discharge, vaginal pain, postcoital bleeding and vaginal odor.   Musculoskeletal: Negative for back pain, joint swelling and myalgias.   Integumentary:  Negative for rash, acne, breast mass, nipple discharge and breast skin changes.   Neurological: Negative for seizures, syncope, numbness and headaches.   Hematological: Negative for adenopathy. Does not bruise/bleed easily.   Psychiatric/Behavioral: Negative for depression and sleep disturbance. The patient is not nervous/anxious.    Breast: Negative for mass, mastodynia, nipple discharge and skin changes      Objective:    Vital Signs:  Vitals:    01/20/22 0821   BP: (!) 140/82   Pulse: 91   Resp: 15   Weight: 72 kg (158 lb 12.8 oz)   Height: 5' 4" (1.626 m)         Physical Exam:  General:  alert, cooperative, appears stated age   Skin:  Skin color, texture, turgor normal. No rashes or lesions   HEENT:  conjunctivae/corneas clear. PERRL.   Neck: supple, trachea midline, no adenopathy or thyromegally   Respiratory:  clear to auscultation bilaterally   Heart:  regular rate and rhythm, S1, S2 normal, no murmur, click, rub or gallop   Breasts:  no discharge, erythema, or tenderness   Abdomen:  normal findings: bowel sounds normal, no masses palpable, no organomegaly and soft, non-tender   Pelvis: External genitalia: normal general appearance  Urinary system: urethral meatus normal, bladder nontender  Vaginal: normal mucosa without prolapse or lesions  Cervix: normal appearance  Uterus: normal size, shape, position  Adnexa: normal size, nontender bilaterally   Extremities: Normal ROM; no edema, no cyanosis   Neurologial: Normal strength and tone. No focal numbness or weakness. Reflexes 2+ and equal.   Psychiatric: normal mood, speech, dress, and thought processes         Assessment:       Healthy female exam.     1. Well woman exam with routine gynecological exam  "   2. Hormone replacement therapy (HRT)    3. Encounter for screening mammogram for malignant neoplasm of breast    4. Cervical cancer screening    5. Menopausal symptoms          Plan:       Thin prep Pap smear.    HPV cotesting  MMG ordered  Refill HRT  RTC in 1 year    COUNSELING:  Coco was counseled on STD pevention, use and side-effects of various contraceptive measures, A.C.O.G. Pap guidelines and recommendations for yearly pelvic exams in addition to recommendations for monthly self breast exams; to see her PCP for other health maintenance.

## 2022-02-15 ENCOUNTER — TELEPHONE (OUTPATIENT)
Dept: OBSTETRICS AND GYNECOLOGY | Facility: CLINIC | Age: 57
End: 2022-02-15
Payer: COMMERCIAL

## 2022-02-15 DIAGNOSIS — R92.8 ABNORMAL MAMMOGRAM OF RIGHT BREAST: ICD-10-CM

## 2022-02-15 DIAGNOSIS — R92.8 ABNORMAL ULTRASOUND OF BREAST: Primary | ICD-10-CM

## 2022-02-15 NOTE — TELEPHONE ENCOUNTER
Pt was notified and stated that she will call to schedule her test at [facility].

## 2022-02-15 NOTE — TELEPHONE ENCOUNTER
Pt called stating she received her mammogram results in her Mychart. Pt is requesting results and to see what are her next steps. Please advise

## 2022-02-15 NOTE — TELEPHONE ENCOUNTER
Usually the radiology dept calls the patient to schedule the additional images. If they have not contacted her, please schedule a right dx mmg and right breast u/s.

## 2022-02-15 NOTE — TELEPHONE ENCOUNTER
----- Message from Chino Szymanski sent at 2/15/2022 11:50 AM CST -----  Contact: self  Coco Roger  MRN: [id number]  Home Phone      [phone]  Work Phone      Not on file.  Mobile          [phone]    Patient Care Team:  Tacho Paul MD as PCP - General (Internal Medicine)  Izzy Chan MD (Obstetrics and Gynecology)  OB? No  What phone number can you be reached at? [phone]  Message:   Patient is stating she is wanting her mammo results and wanting to know what she is needing to do next. Please return call.

## 2022-11-10 NOTE — PROGRESS NOTES
Subjective:    Patient ID: Coco Roger is a 51 y.o. y.o. female.     Chief Complaint: Annual Well Woman Exam     History of Present Illness:  Coco presents today for Annual Well Woman exam. She describes her menses as absent since Feb 2016.She denies pelvic pain.  She denies breast tenderness, masses, nipple discharge. She denies difficulty with urination or bowel movements. She admits to menopausal symptoms such as hotflashes, vaginal dryness, and night sweats. She denies bloating, early satiety, or weight changes. She is sexually active. Contraception is by no method.    Patient reports she is now experiencing fatigue, hot flashes, mood swings, weight gain, decreased libido, and joint pains. Discussed menopausal symptoms and treatment options in detail. Currently taking an OTC product which contains soy and black cohosh and reports improvement in hotflashes.     A full discussion of the benefit-risk ratio of hormonal replacement therapy was carried out. Improvement in vasomotor and other climacteric symptoms were discussed, including possible improvements in sleep and mood. Reduction of risk for osteoporosis was explained. We discussed the study data showing increased risk of thrombo-embolic events such as myocardial infarction, stroke and also possibly breast cancer with estrogen replacement, and how this might affect her. The range of side effects such as breast tenderness, weight gain and including possible increases in lifetime risk of breast cancer and possible thrombotic complications was discussed. We also discussed ACOG's recommendation to use hormone replacement therapy for the relief of hot flashes alone and to be on the lowest dose possible for the shortest amount of time.  Alternative such as herbal and soy-based products were reviewed as well as behavioral modifications and non hormonal prescription medications. All of her questions about this therapy were answered.      Menstrual History:    Patient's last menstrual period was 2016..     OB History      Para Term  AB TAB SAB Ectopic Multiple Living    4 3 3  1  1   3          The following portions of the patient's history were reviewed and updated as appropriate: allergies, current medications, past family history, past medical history, past social history, past surgical history and problem list.    ROS:   CONSTITUTIONAL: Negative for fever, chills, diaphoresis, weakness, fatigue, weight loss, weight gain  ENT: negative for sore throat, nasal congestion, nasal discharge, epistaxis, tinnitus, hearing loss  EYES: negative for blurry vision, decreased vision, loss of vision, eye pain, diplopia, photophobia, discharge  SKIN: Negative for rash, itching, hives  RESPIRATORY: negative for cough, hemoptysis, shortness of breath, pleuritic chest pain, wheezing  CARDIOVASCULAR: negative for chest pain, dyspnea on exertion, orthopnea, paroxysmal nocturnal dyspnea, edema, palpitations  BREAST: negative for breast  tenderness, breast mass, nipple discharge, or skin changes  GASTROINTESTINAL: negative for abdominal pain, flank pain, nausea, vomiting, diarrhea, constipation, black stool, blood in stool  GENITOURINARY: negative for abnormal vaginal bleeding, amenorrhea, decreased libido, dysuria, genital sores, hematuria, incontinence, menorrhagia, pelvic pain, urinary frequency, vaginal discharge  HEMATOLOGIC/LYMPHATIC: negative for swollen lymph nodes, bleeding, bruising  MUSCULOSKELETAL: negative for back pain, joint pain, joint stiffness, joint swelling, muscle pain, muscle weakness  NEUROLOGICAL: negative for dizzy/vertigo, headache, focal weakness, numbness/tingling, speech problems, loss of consciousness, confusion, memory loss  BEHAVORIAL/PSYCH: negative for anxiety, depression, psychosis  ENDOCRINE: negative for polydipsia/polyuria, palpitations, skin changes, temperature intolerance, unexpected weight changes  ALLERGIC/IMMUNOLOGIC:  "negative for urticaria, hay fever, angioedema      Objective:    Vital Signs:  Vitals:    01/05/17 0738   BP: 120/80   Pulse: 82   Resp: 10   Weight: 77.1 kg (170 lb)   Height: 5' 4" (1.626 m)         Physical Exam:  General:  alert, cooperative, appears stated age   Skin:  Skin color, texture, turgor normal. No rashes or lesions   HEENT:  conjunctivae/corneas clear. PERRL.   Neck: supple, trachea midline, no adenopathy or thyromegally   Respiratory:  clear to auscultation bilaterally   Heart:  regular rate and rhythm, S1, S2 normal, no murmur, click, rub or gallop   Breasts:  no discharge, erythema, or tenderness   Abdomen:  normal findings: bowel sounds normal, no masses palpable, no organomegaly and soft, non-tender   Pelvis: External genitalia: normal general appearance  Urinary system: urethral meatus normal, bladder nontender  Vaginal: normal mucosa without prolapse or lesions  Cervix: normal appearance  Uterus: normal size, shape, position  Adnexa: normal size, nontender bilaterally   Extremities: Normal ROM; no edema, no cyanosis   Neurologial: Normal strength and tone. No focal numbness or weakness. Reflexes 2+ and equal.   Psychiatric: normal mood, speech, dress, and thought processes         Assessment:       Healthy female exam.     1. Well woman exam with routine gynecological exam    2. Cervical cancer screening    3. Menopausal symptoms    4. Colon cancer screening    5. Other screening mammogram          Plan:       Thin prep Pap smear.    HPV cotesting  Behavioral modifications/OTC supplements; undecided on HRT at this time  MMG ordered; will preform at [facility] [place]  Colonoscopy ordered   RTC in 1 year    COUNSELING:  Coco was counseled on STD pevention, use and side-effects of various contraceptive measures, A.C.O.G. Pap guidelines and recommendations for yearly pelvic exams in addition to recommendations for monthly self breast exams; to see her PCP for other health maintenance.   " No difficulties

## 2023-01-30 DIAGNOSIS — Z79.890 HORMONE REPLACEMENT THERAPY (HRT): ICD-10-CM

## 2023-01-30 DIAGNOSIS — N95.1 MENOPAUSAL SYMPTOMS: ICD-10-CM

## 2023-01-30 RX ORDER — ESTRADIOL 2 MG/1
TABLET ORAL
Qty: 90 TABLET | Refills: 0 | Status: SHIPPED | OUTPATIENT
Start: 2023-01-30 | End: 2023-04-21

## 2023-01-30 RX ORDER — MEDROXYPROGESTERONE ACETATE 2.5 MG/1
2.5 TABLET ORAL DAILY
Qty: 90 TABLET | Refills: 0 | Status: SHIPPED | OUTPATIENT
Start: 2023-01-30 | End: 2023-04-21

## 2023-01-30 NOTE — TELEPHONE ENCOUNTER
----- Message from Jessica Jaramillo sent at 1/30/2023  9:31 AM CST -----  Contact: self  Coco Roger  MRN: [id number]  Home Phone      [phone]  Work Phone      Not on file.  Mobile          [phone]    Patient Care Team:  Tacho Paul MD as PCP - General (Internal Medicine)  Izzy Chan MD (Obstetrics and Gynecology)  OB? No  What phone number can you be reached at? [phone]  Message: patient needs a refill on medroxyprogefterone. Annual is scheduled  Pharmacy [facility]

## 2023-01-30 NOTE — TELEPHONE ENCOUNTER
Pt was called and she desires refill of medroxyprogesterone and estradiol until annual scheduled with Dr. Arango on 4/26/23.

## 2023-04-26 ENCOUNTER — OFFICE VISIT (OUTPATIENT)
Dept: OBSTETRICS AND GYNECOLOGY | Facility: CLINIC | Age: 58
End: 2023-04-26
Payer: COMMERCIAL

## 2023-04-26 VITALS
SYSTOLIC BLOOD PRESSURE: 122 MMHG | WEIGHT: 168.31 LBS | DIASTOLIC BLOOD PRESSURE: 82 MMHG | OXYGEN SATURATION: 98 % | RESPIRATION RATE: 18 BRPM | BODY MASS INDEX: 28.73 KG/M2 | HEART RATE: 74 BPM | HEIGHT: 64 IN

## 2023-04-26 DIAGNOSIS — Z01.419 WELL WOMAN EXAM WITH ROUTINE GYNECOLOGICAL EXAM: Primary | ICD-10-CM

## 2023-04-26 DIAGNOSIS — N95.1 MENOPAUSAL SYMPTOMS: ICD-10-CM

## 2023-04-26 DIAGNOSIS — Z79.890 HORMONE REPLACEMENT THERAPY (HRT): ICD-10-CM

## 2023-04-26 DIAGNOSIS — Z12.31 BREAST CANCER SCREENING BY MAMMOGRAM: ICD-10-CM

## 2023-04-26 PROCEDURE — 1160F RVW MEDS BY RX/DR IN RCRD: CPT | Mod: CPTII,S$GLB,, | Performed by: OBSTETRICS & GYNECOLOGY

## 2023-04-26 PROCEDURE — 3079F PR MOST RECENT DIASTOLIC BLOOD PRESSURE 80-89 MM HG: ICD-10-PCS | Mod: CPTII,S$GLB,, | Performed by: OBSTETRICS & GYNECOLOGY

## 2023-04-26 PROCEDURE — 99396 PR PREVENTIVE VISIT,EST,40-64: ICD-10-PCS | Mod: S$GLB,,, | Performed by: OBSTETRICS & GYNECOLOGY

## 2023-04-26 PROCEDURE — 3079F DIAST BP 80-89 MM HG: CPT | Mod: CPTII,S$GLB,, | Performed by: OBSTETRICS & GYNECOLOGY

## 2023-04-26 PROCEDURE — 99396 PREV VISIT EST AGE 40-64: CPT | Mod: S$GLB,,, | Performed by: OBSTETRICS & GYNECOLOGY

## 2023-04-26 PROCEDURE — 3008F PR BODY MASS INDEX (BMI) DOCUMENTED: ICD-10-PCS | Mod: CPTII,S$GLB,, | Performed by: OBSTETRICS & GYNECOLOGY

## 2023-04-26 PROCEDURE — 3074F PR MOST RECENT SYSTOLIC BLOOD PRESSURE < 130 MM HG: ICD-10-PCS | Mod: CPTII,S$GLB,, | Performed by: OBSTETRICS & GYNECOLOGY

## 2023-04-26 PROCEDURE — 3074F SYST BP LT 130 MM HG: CPT | Mod: CPTII,S$GLB,, | Performed by: OBSTETRICS & GYNECOLOGY

## 2023-04-26 PROCEDURE — 99999 PR PBB SHADOW E&M-EST. PATIENT-LVL III: CPT | Mod: PBBFAC,,, | Performed by: OBSTETRICS & GYNECOLOGY

## 2023-04-26 PROCEDURE — 1159F PR MEDICATION LIST DOCUMENTED IN MEDICAL RECORD: ICD-10-PCS | Mod: CPTII,S$GLB,, | Performed by: OBSTETRICS & GYNECOLOGY

## 2023-04-26 PROCEDURE — 99999 PR PBB SHADOW E&M-EST. PATIENT-LVL III: ICD-10-PCS | Mod: PBBFAC,,, | Performed by: OBSTETRICS & GYNECOLOGY

## 2023-04-26 PROCEDURE — 3008F BODY MASS INDEX DOCD: CPT | Mod: CPTII,S$GLB,, | Performed by: OBSTETRICS & GYNECOLOGY

## 2023-04-26 PROCEDURE — 1159F MED LIST DOCD IN RCRD: CPT | Mod: CPTII,S$GLB,, | Performed by: OBSTETRICS & GYNECOLOGY

## 2023-04-26 PROCEDURE — 1160F PR REVIEW ALL MEDS BY PRESCRIBER/CLIN PHARMACIST DOCUMENTED: ICD-10-PCS | Mod: CPTII,S$GLB,, | Performed by: OBSTETRICS & GYNECOLOGY

## 2023-04-26 RX ORDER — EZETIMIBE 10 MG/1
10 TABLET ORAL
COMMUNITY
Start: 2023-03-07

## 2023-04-26 RX ORDER — ESTRADIOL 1 MG/1
1 TABLET ORAL DAILY
Qty: 90 TABLET | Refills: 3 | Status: SHIPPED | OUTPATIENT
Start: 2023-04-26 | End: 2024-04-25

## 2023-04-26 RX ORDER — MEDROXYPROGESTERONE ACETATE 2.5 MG/1
2.5 TABLET ORAL DAILY
Qty: 90 TABLET | Refills: 3 | Status: SHIPPED | OUTPATIENT
Start: 2023-04-26

## 2023-04-26 NOTE — PROGRESS NOTES
Subjective:    Patient ID: Coco Roger is a 57 y.o. y.o. female.     Chief Complaint: Annual Well Woman Exam     History of Present Illness:  Coco presents today for Annual Well Woman exam. She describes her menses as  absent .She denies pelvic pain.  She denies breast tenderness, masses, nipple discharge. She denies difficulty with urination or bowel movements. She denies menopausal symptoms such as hotflashes, vaginal dryness, and night sweats; reports HRT working well. She denies bloating, early satiety, or weight changes. She is sexually active. Contraception is by no method.    The following portions of the patient's history were reviewed and updated as appropriate: allergies, current medications, past family history, past medical history, past social history, past surgical history and problem list.      Menstrual History:   Patient's last menstrual period was 2016..     OB History          4    Para   3    Term   3            AB   1    Living   3         SAB   1    IAB        Ectopic        Multiple        Live Births   3                 The following portions of the patient's history were reviewed and updated as appropriate: allergies, current medications, past family history, past medical history, past social history, past surgical history and problem list.        ROS:     Review of Systems   Constitutional:  Negative for activity change, appetite change, chills, diaphoresis, fatigue, fever and unexpected weight change.   HENT:  Negative for mouth sores and tinnitus.    Eyes:  Negative for discharge and visual disturbance.   Respiratory:  Negative for cough, shortness of breath and wheezing.    Cardiovascular:  Negative for chest pain, palpitations and leg swelling.   Gastrointestinal:  Negative for abdominal pain, bloating, blood in stool, constipation, diarrhea, nausea and vomiting.   Endocrine: Negative for diabetes, hair loss, hot flashes, hyperthyroidism and hypothyroidism.  "  Genitourinary:  Negative for dysuria, flank pain, frequency, genital sores, hematuria, urgency, vaginal bleeding, vaginal discharge, vaginal pain, postcoital bleeding and vaginal odor.   Musculoskeletal:  Negative for back pain, joint swelling and myalgias.   Integumentary:  Negative for rash, acne, breast mass, nipple discharge and breast skin changes.   Neurological:  Negative for seizures, syncope, numbness and headaches.   Hematological:  Negative for adenopathy. Does not bruise/bleed easily.   Psychiatric/Behavioral:  Negative for depression and sleep disturbance. The patient is not nervous/anxious.    Breast: Negative for mass, mastodynia, nipple discharge and skin changes    Objective:    Vital Signs:  Vitals:    04/26/23 0734   BP: 122/82   Pulse: 74   Resp: 18   SpO2: 98%   Weight: 76.3 kg (168 lb 4.8 oz)   Height: 5' 4" (1.626 m)         Physical Exam:  General:  alert, cooperative, appears stated age   Skin:  Skin color, texture, turgor normal. No rashes or lesions   HEENT:  conjunctivae/corneas clear. PERRL.   Neck: supple, trachea midline, no adenopathy or thyromegally   Respiratory:  clear to auscultation bilaterally   Heart:  regular rate and rhythm, S1, S2 normal, no murmur, click, rub or gallop   Breasts:  no discharge, erythema, or tenderness   Abdomen:  normal findings: bowel sounds normal, no masses palpable, no organomegaly and soft, non-tender   Pelvis: External genitalia: normal general appearance  Urinary system: urethral meatus normal, bladder nontender  Vaginal: normal mucosa without prolapse or lesions  Cervix: normal appearance  Uterus: normal size, shape, position  Adnexa: normal size, nontender bilaterally   Extremities: Normal ROM; no edema, no cyanosis   Neurologial: Normal strength and tone. No focal numbness or weakness. Reflexes 2+ and equal.   Psychiatric: normal mood, speech, dress, and thought processes         Assessment:       Healthy female exam.     1. Well woman exam with " routine gynecological exam    2. Breast cancer screening by mammogram    3. Hormone replacement therapy (HRT)    4. Menopausal symptoms          Plan:       Pap smear deferred; due in 2027  MMG ordered  Refill HRT; decrease estradiol to 1mg  Discussed colonoscopy; patient declines; insurance doesn't cover  RTC in 1 year    COUNSELING:  Coco was counseled on STD pevention, use and side-effects of various contraceptive measures, A.C.O.G. Pap guidelines and recommendations for yearly pelvic exams in addition to recommendations for monthly self breast exams; to see her PCP for other health maintenance.

## 2023-05-22 ENCOUNTER — PATIENT MESSAGE (OUTPATIENT)
Dept: OBSTETRICS AND GYNECOLOGY | Facility: CLINIC | Age: 58
End: 2023-05-22
Payer: COMMERCIAL

## 2024-04-12 RX ORDER — ESTRADIOL 1 MG/1
1 TABLET ORAL
Qty: 90 TABLET | Refills: 0 | Status: SHIPPED | OUTPATIENT
Start: 2024-04-12

## 2024-04-12 NOTE — TELEPHONE ENCOUNTER
Refill Decision Note   Coco Roger  is requesting a refill authorization.  Brief Assessment and Rationale for Refill:  Approve     Medication Therapy Plan:         Comments:     Note composed:9:01 AM 04/12/2024

## 2024-07-11 DIAGNOSIS — Z79.890 HORMONE REPLACEMENT THERAPY (HRT): ICD-10-CM

## 2024-07-11 DIAGNOSIS — N95.1 MENOPAUSAL SYMPTOMS: ICD-10-CM

## 2024-07-11 RX ORDER — MEDROXYPROGESTERONE ACETATE 2.5 MG/1
2.5 TABLET ORAL
Qty: 90 TABLET | Refills: 0 | Status: SHIPPED | OUTPATIENT
Start: 2024-07-11

## 2024-07-12 NOTE — TELEPHONE ENCOUNTER
Refill Decision Note   Coco Roger  is requesting a refill authorization.  Brief Assessment and Rationale for Refill:  Approve     Medication Therapy Plan:        Comments:     Note composed:11:01 PM 07/11/2024            Patent

## 2024-08-12 ENCOUNTER — OFFICE VISIT (OUTPATIENT)
Dept: OBSTETRICS AND GYNECOLOGY | Facility: CLINIC | Age: 59
End: 2024-08-12
Payer: COMMERCIAL

## 2024-08-12 VITALS
WEIGHT: 155 LBS | DIASTOLIC BLOOD PRESSURE: 78 MMHG | SYSTOLIC BLOOD PRESSURE: 110 MMHG | HEIGHT: 64 IN | BODY MASS INDEX: 26.46 KG/M2 | HEART RATE: 69 BPM

## 2024-08-12 DIAGNOSIS — N95.1 MENOPAUSAL SYMPTOMS: ICD-10-CM

## 2024-08-12 DIAGNOSIS — Z79.890 HORMONE REPLACEMENT THERAPY (HRT): ICD-10-CM

## 2024-08-12 DIAGNOSIS — Z01.419 WELL WOMAN EXAM WITH ROUTINE GYNECOLOGICAL EXAM: Primary | ICD-10-CM

## 2024-08-12 DIAGNOSIS — Z12.31 SCREENING MAMMOGRAM FOR BREAST CANCER: ICD-10-CM

## 2024-08-12 PROCEDURE — 3008F BODY MASS INDEX DOCD: CPT | Mod: CPTII,S$GLB,, | Performed by: OBSTETRICS & GYNECOLOGY

## 2024-08-12 PROCEDURE — 1159F MED LIST DOCD IN RCRD: CPT | Mod: CPTII,S$GLB,, | Performed by: OBSTETRICS & GYNECOLOGY

## 2024-08-12 PROCEDURE — 99396 PREV VISIT EST AGE 40-64: CPT | Mod: S$GLB,,, | Performed by: OBSTETRICS & GYNECOLOGY

## 2024-08-12 PROCEDURE — 3074F SYST BP LT 130 MM HG: CPT | Mod: CPTII,S$GLB,, | Performed by: OBSTETRICS & GYNECOLOGY

## 2024-08-12 PROCEDURE — 3078F DIAST BP <80 MM HG: CPT | Mod: CPTII,S$GLB,, | Performed by: OBSTETRICS & GYNECOLOGY

## 2024-08-12 PROCEDURE — 1160F RVW MEDS BY RX/DR IN RCRD: CPT | Mod: CPTII,S$GLB,, | Performed by: OBSTETRICS & GYNECOLOGY

## 2024-08-12 PROCEDURE — 99999 PR PBB SHADOW E&M-EST. PATIENT-LVL III: CPT | Mod: PBBFAC,,, | Performed by: OBSTETRICS & GYNECOLOGY

## 2024-08-12 RX ORDER — MEDROXYPROGESTERONE ACETATE 2.5 MG/1
2.5 TABLET ORAL DAILY
Qty: 90 TABLET | Refills: 4 | Status: SHIPPED | OUTPATIENT
Start: 2024-08-12

## 2024-08-12 RX ORDER — ESTRADIOL 1 MG/1
1 TABLET ORAL DAILY
Qty: 90 TABLET | Refills: 4 | Status: SHIPPED | OUTPATIENT
Start: 2024-08-12

## 2024-08-12 NOTE — PROGRESS NOTES
Subjective:    Patient ID: Coco Roger is a 59 y.o. y.o. female.     Chief Complaint: Annual Well Woman Exam     History of Present Illness:  Coco presents today for Annual Well Woman exam. She describes her menses as absent, no PMB.  She denies pelvic pain.  She denies breast tenderness, masses, nipple discharge. She denies GYN complaints. She denies difficulty with urination or bowel movements. She reports menopausal symptoms such as hotflashes, vaginal dryness, and night sweats are well controlled with HRT. She denies bloating, early satiety, or weight changes. She is sexually active.      Menstrual History:   Patient's last menstrual period was 2016..     OB History          4    Para   3    Term   3            AB   1    Living   3         SAB   1    IAB        Ectopic        Multiple        Live Births   3                 The following portions of the patient's history were reviewed and updated as appropriate: allergies, current medications, past family history, past medical history, past social history, past surgical history, and problem list.    ROS:   Review of Systems   Constitutional:  Negative for chills, diaphoresis, fatigue, fever and unexpected weight change.   HENT:  Negative for congestion, hearing loss, postnasal drip, rhinorrhea, sinus pressure, sinus pain, sore throat and tinnitus.    Eyes:  Negative for pain, discharge and visual disturbance.   Respiratory:  Negative for apnea, cough, shortness of breath and wheezing.    Cardiovascular:  Negative for chest pain, palpitations and leg swelling.   Gastrointestinal:  Negative for abdominal pain, constipation, diarrhea, nausea and vomiting.   Endocrine: Negative for cold intolerance, heat intolerance, polydipsia, polyphagia and polyuria.   Genitourinary:  Negative for difficulty urinating, dyspareunia, dysuria, enuresis, flank pain, frequency, genital sores, hematuria, menstrual problem, pelvic pain, urgency, vaginal  bleeding, vaginal discharge and vaginal pain.   Musculoskeletal:  Negative for arthralgias, back pain, joint swelling, myalgias, neck pain and neck stiffness.   Skin:  Negative for color change, pallor and rash.   Allergic/Immunologic: Negative for environmental allergies, food allergies and immunocompromised state.   Neurological:  Negative for dizziness, weakness, light-headedness, numbness and headaches.   Hematological:  Negative for adenopathy. Does not bruise/bleed easily.   Psychiatric/Behavioral:  Negative for agitation and confusion. The patient is not nervous/anxious.          Objective:    Vital Signs:  Vitals:    08/12/24 1218   BP: 110/78   Pulse: 69       Physical Exam:   Examination performed with Chaperone present  General:  alert, cooperative, no distress   Skin:  Skin color, texture, turgor normal. No rashes or lesions   HEENT:  extra ocular movement intact, sclera clear, anicteric   Neck: supple, trachea midline, no adenopathy or thyromegally   Respiratory:  Normal effort   Breasts:  no discharge, erythema, tenderness, or palpable masses; no axillary lymphadenopathy   Abdomen:  soft, nontender, no palpable masses   Pelvis: External genitalia: normal general appearance  Urinary system: urethral meatus normal, bladder nontender  Vaginal: normal mucosa without prolapse or lesions  Cervix: normal appearance  Uterus: normal size, shape, position  Adnexa: normal size, nontender bilaterally   Extremities: Normal ROM; no edema, no cyanosis   Neurologial: Normal strength and tone. No focal numbness or weakness.   Psychiatric: normal mood, speech, dress, and thought processes         Assessment:       Healthy female exam.     1. Well woman exam with routine gynecological exam    2. Hormone replacement therapy (HRT)    3. Menopausal symptoms    4. Screening mammogram for breast cancer          Plan:      Well woman exam with routine gynecological exam    Hormone replacement therapy (HRT)  -     estradioL  (ESTRACE) 1 MG tablet; Take 1 tablet (1 mg total) by mouth once daily.  Dispense: 90 tablet; Refill: 4  -     medroxyPROGESTERone (PROVERA) 2.5 MG tablet; Take 1 tablet (2.5 mg total) by mouth once daily.  Dispense: 90 tablet; Refill: 4    Menopausal symptoms  -     estradioL (ESTRACE) 1 MG tablet; Take 1 tablet (1 mg total) by mouth once daily.  Dispense: 90 tablet; Refill: 4  -     medroxyPROGESTERone (PROVERA) 2.5 MG tablet; Take 1 tablet (2.5 mg total) by mouth once daily.  Dispense: 90 tablet; Refill: 4    Screening mammogram for breast cancer  -     Mammo Digital Screening Bilat w/ [facility]; Future; Expected date: 08/12/2024        Colonoscopy to be scheduled.   Pap 2022 NEM with negative HPV    COUNSELING:  Coco was counseled on A.C.O.G. Pap guidelines and recommendations for yearly pelvic exams in addition to recommendations for monthly self breast exams; to see her PCP for other health maintenance.

## 2024-08-20 DIAGNOSIS — R92.8 ABNORMAL MAMMOGRAM OF RIGHT BREAST: Primary | ICD-10-CM

## 2024-08-24 DIAGNOSIS — R92.8 ABNORMAL MAMMOGRAM: Primary | ICD-10-CM

## 2024-08-24 DIAGNOSIS — N63.11 MASS OF UPPER OUTER QUADRANT OF RIGHT BREAST: ICD-10-CM

## 2024-08-26 ENCOUNTER — PATIENT MESSAGE (OUTPATIENT)
Dept: RADIOLOGY | Facility: HOSPITAL | Age: 59
End: 2024-08-26
Payer: COMMERCIAL

## 2024-08-26 ENCOUNTER — TELEPHONE (OUTPATIENT)
Dept: RADIOLOGY | Facility: HOSPITAL | Age: 59
End: 2024-08-26
Payer: COMMERCIAL

## 2024-08-26 ENCOUNTER — TELEPHONE (OUTPATIENT)
Dept: OBSTETRICS AND GYNECOLOGY | Facility: CLINIC | Age: 59
End: 2024-08-26
Payer: COMMERCIAL

## 2024-08-26 NOTE — TELEPHONE ENCOUNTER
Spoke with patient. Reviewed breast biopsy procedure and reviewed instructions for breast biopsy. Patient expressed understanding and all questions were answered. Provided patient with my phone number to call for any further concerns or questions.   Patient scheduled breast biopsy at the [facility] on 9/12/2024.

## 2024-08-28 RX ORDER — CITALOPRAM 10 MG/1
10 TABLET ORAL DAILY
Qty: 30 TABLET | Refills: 2 | Status: SHIPPED | OUTPATIENT
Start: 2024-08-28 | End: 2025-08-28

## 2024-08-28 RX ORDER — DIAZEPAM 2 MG/1
2 TABLET ORAL EVERY 8 HOURS PRN
Qty: 10 TABLET | Refills: 0 | Status: SHIPPED | OUTPATIENT
Start: 2024-08-28 | End: 2025-08-28

## 2024-08-29 DIAGNOSIS — N63.0 BREAST MASS IN FEMALE: Primary | ICD-10-CM

## 2024-08-29 DIAGNOSIS — N63.0 BREAST MASS SEEN ON MAMMOGRAM: ICD-10-CM

## 2024-08-30 ENCOUNTER — TELEPHONE (OUTPATIENT)
Dept: SURGERY | Facility: CLINIC | Age: 59
End: 2024-08-30
Payer: COMMERCIAL

## 2024-08-30 NOTE — TELEPHONE ENCOUNTER
Received message that patient needed to be scheduled for a breast MRI.  Reached out to patient and scheduled her for MRI on 9/6.  Patient verbalized understanding of appointment date, time and location.

## 2024-09-06 ENCOUNTER — HOSPITAL ENCOUNTER (OUTPATIENT)
Dept: RADIOLOGY | Facility: HOSPITAL | Age: 59
Discharge: HOME | End: 2024-09-06
Attending: OBSTETRICS & GYNECOLOGY
Payer: COMMERCIAL

## 2024-09-06 DIAGNOSIS — N63.0 BREAST MASS IN FEMALE: ICD-10-CM

## 2024-09-06 DIAGNOSIS — N63.0 BREAST MASS SEEN ON MAMMOGRAM: ICD-10-CM

## 2024-09-06 PROCEDURE — 77049 MRI BREAST C-+ W/CAD BI: CPT | Mod: TC

## 2024-09-06 PROCEDURE — 25500020 PHARM REV CODE 255: Performed by: OBSTETRICS & GYNECOLOGY

## 2024-09-06 PROCEDURE — A9577 INJ MULTIHANCE: HCPCS | Performed by: OBSTETRICS & GYNECOLOGY

## 2024-09-06 RX ADMIN — GADOBENATE DIMEGLUMINE 15 ML: 529 INJECTION, SOLUTION INTRAVENOUS at 06:09

## 2024-09-12 ENCOUNTER — HOSPITAL ENCOUNTER (OUTPATIENT)
Dept: RADIOLOGY | Facility: HOSPITAL | Age: 59
Discharge: HOME | End: 2024-09-12
Attending: OBSTETRICS & GYNECOLOGY
Payer: COMMERCIAL

## 2024-09-12 DIAGNOSIS — R92.8 ABNORMAL FINDING ON BREAST IMAGING: ICD-10-CM

## 2024-09-12 PROCEDURE — 25000003 PHARM REV CODE 250: Performed by: OBSTETRICS & GYNECOLOGY

## 2024-09-12 PROCEDURE — A4648 IMPLANTABLE TISSUE MARKER: HCPCS

## 2024-09-12 PROCEDURE — 27201068 US BREAST BIOPSY WITH IMAGING 1ST SITE LEFT

## 2024-09-12 PROCEDURE — 27201068 US BREAST BIOPSY WITH IMAGING 1ST SITE RIGHT

## 2024-09-12 PROCEDURE — 19084 BX BREAST ADD LESION US IMAG: CPT

## 2024-09-12 PROCEDURE — 27201068 US BIOPSY LYMPH NODE AXILLA

## 2024-09-12 PROCEDURE — 38505 NEEDLE BIOPSY LYMPH NODES: CPT

## 2024-09-12 PROCEDURE — 76642 ULTRASOUND BREAST LIMITED: CPT | Mod: TC,50

## 2024-09-12 PROCEDURE — 77066 DX MAMMO INCL CAD BI: CPT | Mod: TC

## 2024-09-12 RX ORDER — LIDOCAINE HYDROCHLORIDE 10 MG/ML
6 INJECTION, SOLUTION EPIDURAL; INFILTRATION; INTRACAUDAL; PERINEURAL ONCE
Status: COMPLETED | OUTPATIENT
Start: 2024-09-12 | End: 2024-09-12

## 2024-09-12 RX ORDER — LIDOCAINE HYDROCHLORIDE 20 MG/ML
20 INJECTION, SOLUTION INFILTRATION; PERINEURAL ONCE
Status: COMPLETED | OUTPATIENT
Start: 2024-09-12 | End: 2024-09-12

## 2024-09-12 RX ORDER — LIDOCAINE HYDROCHLORIDE AND EPINEPHRINE 20; 10 MG/ML; UG/ML
20 INJECTION, SOLUTION INFILTRATION; PERINEURAL ONCE
Status: DISCONTINUED | OUTPATIENT
Start: 2024-09-12 | End: 2024-09-13 | Stop reason: HOSPADM

## 2024-09-12 RX ADMIN — LIDOCAINE HYDROCHLORIDE 20 ML: 20 INJECTION, SOLUTION INFILTRATION; PERINEURAL at 02:09

## 2024-09-12 RX ADMIN — LIDOCAINE HYDROCHLORIDE 6 ML: 10 INJECTION, SOLUTION EPIDURAL; INFILTRATION; INTRACAUDAL; PERINEURAL at 02:09

## 2024-09-18 ENCOUNTER — DOCUMENTATION ONLY (OUTPATIENT)
Dept: SURGERY | Facility: CLINIC | Age: 59
End: 2024-09-18
Payer: COMMERCIAL

## 2024-09-18 NOTE — PROGRESS NOTES
Subjective:       Patient ID: Coco Roger    Chief Complaint: 59 year old female presents for an explain my results discussion.       HPI: Presents for explain my results. She is tearful on exam. Daughter is getting  in June.   She exercises with Marita regularly.    Screening mammogram on 8/19/24 showed New 2 cm spiculated mass upper outer right breast.      Follow-up diagnostic mammogram and ultrasound was performed on 9/6/24.  Left Breast:     Mass: Left breast 0.6 cm x 0.6 cm x 0.5 cm mass at the lower outer position and posterior depth. Assessment: 4 - Suspicious finding. Biopsy is recommended.  A correlate was not definitively established on the outside mammographic and ultrasound examinations.  Additional evaluation with ultrasound is recommended.  If a correlate is established, an ultrasound-guided core needle biopsy is recommended.  If a correlate is not established, an MRI guided biopsy is recommended.        Non-mass Enhancement: Left breast 0.8 cm non-mass enhancement at the upper inner position and middle depth. Assessment: 4 - Suspicious finding.  This area can be expectantly managed pending the biopsy results.  If warranted for surgical planning, an MRI guided biopsy is recommended.     Right Breast:  Overall, findings are highly suspicious for multifocal/multicentric right breast malignancy with findings as follows:     Mass: Right breast 2.4 cm x 2.2 cm x 2.6 cm mass at the upper outer position and posterior depth. Assessment: 5 - Highly suggestive of malignancy. Biopsy is recommended.  This mass was appreciated on the prior mammographic and ultrasound examinations.  An ultrasound-guided core needle biopsy is recommended.     Mass: Right breast 0.7 cm x 0.6 cm x 0.9 cm mass at the upper inner position and middle depth. Assessment: 5 - Highly suggestive of malignancy. Biopsy is recommended. This mass was appreciated on the prior mammographic and ultrasound examinations.  An ultrasound-guided  core needle biopsy is recommended.     Mass: Right breast 1.1 cm x 0.8 cm x 1.4 cm mass at the outer central position and anterior depth. Assessment: 5 - Highly suggestive of malignancy. Biopsy is recommended. This mass was appreciated on the prior mammographic and ultrasound examinations.  An ultrasound-guided core needle biopsy is recommended.     Non-mass Enhancement: Right breast 1 cm non-mass enhancement at the axillary tail position. Assessment: 0 - Incomplete. Diagnostic Mammogram and/or Ultrasound is recommended.  The need for biopsy will be determined at that time     Left Breast: Biopsy on 9/12/24 showed  grade 1 carcinoma (histologic grade 1, nuclear grade 1, mitotic index 1), 100% ER positive, 90% NE positive, HER2 was 1+,  Ki-67 was 25.  Right Breast: Biopsy on 9/12/24 showed  grade 1 carcinoma (histologic grade 1, nuclear grade 1, mitotic index 1), 95% ER positive, 30% NE positive, HER2 was 2+, FISH  pending,  Ki-67 was 10. 3 different tumors in right breast that all nina ER positive NE positive, HER2 2+ FISH non amplified.  Right lymph node: Fibroadenoma in left axilla, negative for cancer     Menstrual History:    Menarche - 11   G - 4, P - 3 - oldest child 27, breast fed 30 months  , Menopause - 54; hormone use x 5 years estradiol      Family History -                                 Breast - sister breast cancer diagnosed 37 - genetics negative                                 Ovarian -   N/A                 Social History :    Smoking -  Negative             ETOH -  None            Work -         PMH:  HTN    Breast cancer- discussion.   Breast anatomy discussed.   Different provider roles discussed including surgical, medical and radiation oncologist.   Path results explained in detail. General education discussed as well- see below.   Infiltrating ductal carcinomas are divided into three grades based upon a combination of architectural and cytologic features, usually assessed utilizing  a scoring system based on three parameters:  ?Well-differentiated (grade 1) - Well-differentiated tumors have cells that infiltrate the stroma as solid nests of glands. The nuclei are relatively uniform with little or no evidence of mitotic activity   ?Moderately differentiated (grade 2) - Moderately differentiated tumors have cells that infiltrate as solid nests with some glandular differentiation. There is some nuclear pleomorphism and a moderate mitotic rate   ?Poorly differentiated (grade 3) - Poorly differentiated tumors are composed of solid nests of neoplastic cells without evidence of gland formation. There is marked nuclear atypia and considerable mitotic activity       Estrogen and Progesterone receptors- indicated the hormone status of breast cancer. The cells of this type of breast cancer have receptors that allow them to use hormones to grow.      HER2-positive breast cancer is a breast cancer that tests positive for a protein called human epidermal growth factor receptor 2 (HER2). This protein promotes the growth of cancer cells.   Amplification or overexpression of the human epidermal growth factor receptor 2 (HER2) oncogene is present in approximately 15 percent of primary invasive breast cancer .   Treatments that specifically target HER2 are very effective.     Ki-67 is a way to measure how fast cancer cells in a tumor are dividing. Ki-67 is a protein that is found only in cells that are dividing. A high Ki-67 proliferation index means many cells are dividing quickly and that the cancer is likely to grow and spread. A Ki-67 proliferation index over 30% is typically considered high.     Review of Systems    Emotional  Tenderness in the right axilla  Objective:      Physical Exam    Limited due to virtual visit  Assessment:      1. 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 Patel on Tuesday  Discussed surgery.  Discussed eventual use of AI      Return to clinic in 1 week with MD appointment.     Patient is in agreement with the proposed treatment plan. All questions were answered to the patient's satisfaction. Patient knows to call clinic for any new or worsening symptoms and if anything is needed before the next clinic visit.          Justine Rocha, FNP-C  Hematology & Medical Oncology   [address] [place] [address]  ph. [phone]  Fax. [phone]    Collaborating physician, Dr. Rodriguez.    Approximately 23 minutes were spent face-to-face with the patient.  Approximately 30 minutes in total were spent on this encounter, which includes face-to-face time and non-face-to-face time preparing to see the patient (e.g., review of tests), obtaining and/or reviewing separately obtained history, documenting clinical information in the electronic or other health record, independently interpreting results (not separately reported) and communicating results to the patient/family/caregiver, or care coordination (not separately reported).     Route Chart for Scheduling    Med Onc Chart Routing      Follow up with physician . Will be referred from Dr. Patel   Follow up with [facility]    Infusion scheduling note    Injection scheduling note    Labs    Imaging    Pharmacy appointment    Other referrals            The patient location is: her home  The chief complaint leading to consultation is: breast cancer    Visit type: audiovisual    Face to Face time with patient: 23  30 minutes of total time spent on the encounter, which includes face to face time and non-face to face time preparing to see the patient (eg, review of tests), Obtaining and/or reviewing separately obtained history, Documenting clinical information in the electronic or other health record, Independently interpreting results (not separately reported) and communicating results to the patient/family/caregiver, or Care coordination (not separately  reported).         Each patient to whom he or she provides medical services by telemedicine is:  (1) informed of the relationship between the physician and patient and the respective role of any other health care provider with respect to management of the patient; and (2) notified that he or she may decline to receive medical services by telemedicine and may withdraw from such care at any time.    Notes: see above

## 2024-09-18 NOTE — PROGRESS NOTES
Called Patient with results of breast biopsy from 9/12/24.  Explained that the biopsy showed Invasive Ductal Carcinoma . Discussed what this means and that the next step is to meet with a breast surgeon. An appt was made for 9/24/24 with Dr. Patel.Pt to have VV with Justine Rocha NP 8/19/24.   Reviewed location of breast center. Patient verbalized understanding.     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 Patel)  First Available Date vs. Scheduled Date (days): 1     Treatment  Current Status: Staging work-up    Surgical Oncologist: Dr. Patel  Consult Date: 09/24/24    Medical Oncologist: Dr. Shaffer       Procedures: Biopsy  Biopsy Schedule Date: 09/12/24       ER: Positive (Left & Right)  NH: Positive (Left & Right)  Her2: Negative (Left & R pending)       Support Systems: Family members     Acuity      Follow Up  No follow-ups on file.

## 2024-09-19 ENCOUNTER — OFFICE VISIT (OUTPATIENT)
Dept: HEMATOLOGY/ONCOLOGY | Facility: CLINIC | Age: 59
End: 2024-09-19
Payer: COMMERCIAL

## 2024-09-19 DIAGNOSIS — C50.411 MALIGNANT NEOPLASM OF UPPER-OUTER QUADRANT OF BOTH BREASTS IN FEMALE, ESTROGEN RECEPTOR POSITIVE: Primary | ICD-10-CM

## 2024-09-19 DIAGNOSIS — C50.412 MALIGNANT NEOPLASM OF UPPER-OUTER QUADRANT OF BOTH BREASTS IN FEMALE, ESTROGEN RECEPTOR POSITIVE: Primary | ICD-10-CM

## 2024-09-19 DIAGNOSIS — Z17.0 MALIGNANT NEOPLASM OF UPPER-OUTER QUADRANT OF BOTH BREASTS IN FEMALE, ESTROGEN RECEPTOR POSITIVE: Primary | ICD-10-CM

## 2024-09-19 PROCEDURE — G2211 COMPLEX E/M VISIT ADD ON: HCPCS | Mod: 95,,, | Performed by: NURSE PRACTITIONER

## 2024-09-19 PROCEDURE — 99205 OFFICE O/P NEW HI 60 MIN: CPT | Mod: 95,,, | Performed by: NURSE PRACTITIONER

## 2024-09-19 PROCEDURE — 1160F RVW MEDS BY RX/DR IN RCRD: CPT | Mod: CPTII,95,, | Performed by: NURSE PRACTITIONER

## 2024-09-19 PROCEDURE — 1159F MED LIST DOCD IN RCRD: CPT | Mod: CPTII,95,, | Performed by: NURSE PRACTITIONER

## 2024-09-24 ENCOUNTER — DOCUMENTATION ONLY (OUTPATIENT)
Dept: SURGERY | Facility: CLINIC | Age: 59
End: 2024-09-24
Payer: COMMERCIAL

## 2024-09-24 ENCOUNTER — OFFICE VISIT (OUTPATIENT)
Dept: SURGERY | Facility: CLINIC | Age: 59
End: 2024-09-24
Payer: COMMERCIAL

## 2024-09-24 ENCOUNTER — LAB VISIT (OUTPATIENT)
Dept: LAB | Facility: HOSPITAL | Age: 59
End: 2024-09-24
Attending: SURGERY
Payer: COMMERCIAL

## 2024-09-24 VITALS
HEIGHT: 64 IN | WEIGHT: 150 LBS | SYSTOLIC BLOOD PRESSURE: 145 MMHG | HEART RATE: 60 BPM | BODY MASS INDEX: 25.61 KG/M2 | DIASTOLIC BLOOD PRESSURE: 77 MMHG

## 2024-09-24 DIAGNOSIS — C50.412 MALIGNANT NEOPLASM OF UPPER-OUTER QUADRANT OF BOTH BREASTS IN FEMALE, ESTROGEN RECEPTOR POSITIVE: ICD-10-CM

## 2024-09-24 DIAGNOSIS — Z17.0 MALIGNANT NEOPLASM OF UPPER-OUTER QUADRANT OF BOTH BREASTS IN FEMALE, ESTROGEN RECEPTOR POSITIVE: Primary | ICD-10-CM

## 2024-09-24 DIAGNOSIS — C50.411 MALIGNANT NEOPLASM OF UPPER-OUTER QUADRANT OF BOTH BREASTS IN FEMALE, ESTROGEN RECEPTOR POSITIVE: Primary | ICD-10-CM

## 2024-09-24 DIAGNOSIS — C50.412 MALIGNANT NEOPLASM OF UPPER-OUTER QUADRANT OF BOTH BREASTS IN FEMALE, ESTROGEN RECEPTOR POSITIVE: Primary | ICD-10-CM

## 2024-09-24 DIAGNOSIS — Z01.818 PRE-OP TESTING: ICD-10-CM

## 2024-09-24 DIAGNOSIS — C50.411 MALIGNANT NEOPLASM OF UPPER-OUTER QUADRANT OF BOTH BREASTS IN FEMALE, ESTROGEN RECEPTOR POSITIVE: ICD-10-CM

## 2024-09-24 DIAGNOSIS — Z17.0 MALIGNANT NEOPLASM OF UPPER-OUTER QUADRANT OF BOTH BREASTS IN FEMALE, ESTROGEN RECEPTOR POSITIVE: ICD-10-CM

## 2024-09-24 LAB
ALBUMIN SERPL BCP-MCNC: 3.8 G/DL (ref 3.5–5.2)
ALP SERPL-CCNC: 65 U/L (ref 55–135)
ALT SERPL W/O P-5'-P-CCNC: 17 U/L (ref 10–44)
ANION GAP SERPL CALC-SCNC: 9 MMOL/L (ref 8–16)
AST SERPL-CCNC: 21 U/L (ref 10–40)
BASOPHILS # BLD AUTO: 0.04 K/UL (ref 0–0.2)
BASOPHILS NFR BLD: 0.3 % (ref 0–1.9)
BILIRUB SERPL-MCNC: 0.9 MG/DL (ref 0.1–1)
BUN SERPL-MCNC: 15 MG/DL (ref 6–20)
CALCIUM SERPL-MCNC: 9.8 MG/DL (ref 8.7–10.5)
CHLORIDE SERPL-SCNC: 102 MMOL/L (ref 95–110)
CO2 SERPL-SCNC: 28 MMOL/L (ref 23–29)
CREAT SERPL-MCNC: 1.1 MG/DL (ref 0.5–1.4)
DIFFERENTIAL METHOD BLD: ABNORMAL
EOSINOPHIL # BLD AUTO: 0.1 K/UL (ref 0–0.5)
EOSINOPHIL NFR BLD: 0.4 % (ref 0–8)
ERYTHROCYTE [DISTWIDTH] IN BLOOD BY AUTOMATED COUNT: 12.7 % (ref 11.5–14.5)
EST. GFR  (NO RACE VARIABLE): 58.2 ML/MIN/1.73 M^2
GLUCOSE SERPL-MCNC: 98 MG/DL (ref 70–110)
HCT VFR BLD AUTO: 43.5 % (ref 37–48.5)
HGB BLD-MCNC: 15 G/DL (ref 12–16)
IMM GRANULOCYTES # BLD AUTO: 0.02 K/UL (ref 0–0.04)
IMM GRANULOCYTES NFR BLD AUTO: 0.1 % (ref 0–0.5)
LYMPHOCYTES # BLD AUTO: 11.6 K/UL (ref 1–4.8)
LYMPHOCYTES NFR BLD: 73.6 % (ref 18–48)
MCH RBC QN AUTO: 31.6 PG (ref 27–31)
MCHC RBC AUTO-ENTMCNC: 34.5 G/DL (ref 32–36)
MCV RBC AUTO: 92 FL (ref 82–98)
MONOCYTES # BLD AUTO: 0.4 K/UL (ref 0.3–1)
MONOCYTES NFR BLD: 2.3 % (ref 4–15)
NEUTROPHILS # BLD AUTO: 3.7 K/UL (ref 1.8–7.7)
NEUTROPHILS NFR BLD: 23.3 % (ref 38–73)
NRBC BLD-RTO: 0 /100 WBC
PLATELET # BLD AUTO: 263 K/UL (ref 150–450)
PMV BLD AUTO: 10.5 FL (ref 9.2–12.9)
POTASSIUM SERPL-SCNC: 3.5 MMOL/L (ref 3.5–5.1)
PROT SERPL-MCNC: 7.2 G/DL (ref 6–8.4)
RBC # BLD AUTO: 4.75 M/UL (ref 4–5.4)
SODIUM SERPL-SCNC: 139 MMOL/L (ref 136–145)
WBC # BLD AUTO: 15.78 K/UL (ref 3.9–12.7)

## 2024-09-24 PROCEDURE — 3008F BODY MASS INDEX DOCD: CPT | Mod: CPTII,S$GLB,, | Performed by: SURGERY

## 2024-09-24 PROCEDURE — 3078F DIAST BP <80 MM HG: CPT | Mod: CPTII,S$GLB,, | Performed by: SURGERY

## 2024-09-24 PROCEDURE — 3077F SYST BP >= 140 MM HG: CPT | Mod: CPTII,S$GLB,, | Performed by: SURGERY

## 2024-09-24 PROCEDURE — 99205 OFFICE O/P NEW HI 60 MIN: CPT | Mod: S$GLB,,, | Performed by: SURGERY

## 2024-09-24 PROCEDURE — 80053 COMPREHEN METABOLIC PANEL: CPT | Performed by: SURGERY

## 2024-09-24 PROCEDURE — 1159F MED LIST DOCD IN RCRD: CPT | Mod: CPTII,S$GLB,, | Performed by: SURGERY

## 2024-09-24 PROCEDURE — 99999 PR PBB SHADOW E&M-EST. PATIENT-LVL III: CPT | Mod: PBBFAC,,, | Performed by: SURGERY

## 2024-09-24 PROCEDURE — 85007 BL SMEAR W/DIFF WBC COUNT: CPT | Performed by: SURGERY

## 2024-09-24 PROCEDURE — 36415 COLL VENOUS BLD VENIPUNCTURE: CPT | Performed by: SURGERY

## 2024-09-24 PROCEDURE — 85060 BLOOD SMEAR INTERPRETATION: CPT | Mod: ,,, | Performed by: PATHOLOGY

## 2024-09-24 PROCEDURE — 85027 COMPLETE CBC AUTOMATED: CPT | Performed by: SURGERY

## 2024-09-24 NOTE — PROGRESS NOTES
Nurse Navigator Note:     Met with patient during her consult with Dr. Patel.  Patient and I reviewed the information she discussed with Dr. Patel, including treatment options, diagnosis, and future plans for workup. Patient and I went through the new patient booklet, explained some of the information and why it is provided.     Also offered patient consults with our other specialty clinics: Integrative Oncology, Survivorship and/or Women's Gynecologic needs, our breast physical therapy department for pre-op and post-operative assessments, Oncologic Psychology for psychological support, and Oncologic Nutrition for nutritional counseling. Explained to patient that all of these support services are completely optional. Discussed that physical therapy may call patient to offer pre-op appt, and what that appt would entail.     Patient was given a copy of her appointments, Dr. Patel's card, and my card. Encouraged her to call me if she has any questions or concerns or would like to schedule any additional appointments. Verbalized understanding of all information.     Genetics completed. Referral placed for PT and integrative oncology. Email added to support group.    Surgery bag and bra given to patient.    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 Patel)  First Available Date vs. Scheduled Date (days): 1     Treatment  Current Status: Staging work-up    Surgical Oncologist: Dr. Patel  Type of Surgery: Bilateral mastectomy with SLNB and no recon  Consult Date: 09/24/24  Surgery Schedule Date: 10/30/24    Medical Oncologist: Dr. Shaffer  Consult Date: 10/01/24       Procedures: Genetic test  Biopsy Schedule Date: 09/12/24  Genetic Testing Date Sent:  09/24/24    General Referrals: Support Group; Physical Therapy; Integrative Medicine  Physical Therapy Referral Date: 09/24/24    ER: Positive (Left & Right)  [place]: Positive (Left & Right)  Her2: Negative (Left & R pending)       Support Systems: Spouse/significant other; Family members     Acuity      Follow Up  No follow-ups on file.

## 2024-09-24 NOTE — PROGRESS NOTES
Breast Surgery  [facility]  Department of Surgery      REFERRING PROVIDER: Self, Marvin  No address on file    Chief Complaint: breast mass      Subjective:      Patient ID: Coco Roger is a 59 y.o. female who presents with bilateral IDC. Left sided ER+, AR + HER -. R sided ER+ AR+ HER -. She first palpated a mass in July after getting out of the shower and then had abnormal screening mammogram read as Birad 0 on 24.    Follow-up ultrasound (24) showed Three suspicious R breast regions: 2.2 cm spiculated mass deep upper outer right breast highly suspicious for malignancy. 6 mm enhancing nodule 12:00 o'clock right breast posterior depth, highly suspicious for malignancy. Indeterminate 1.4 cm mildly enhancing 3rd lesion within the anterior aspect of the right breast. As well as normal lymph nodes and a left breast lesion: 7 mm solid enhancing lesion upper outer LEFT breast suspicious for malignancy. A ultrasound guided biopsy was performed on 24 with pathology revealing infiltrating ductal carcinoma of the breast.     Patient does routinely do self breast exams.  Patient had noted a change on breast exam.  Patient denies nipple discharge. Patient denies to previous breast biopsy. Patient denies a personal history of breast cancer.    Findings at that time were the followin. LEFT BREAST, MASS AT 5 O'CLOCK 9 CM FROM NIPPLE, BIOPSY:  Invasive ductal carcinoma (no special type), [place] histologic grade 1 (tubules: 3, nuclear:  1, mitoses: 1).  Largest continuous invasive focus measures 4 mm.  Atypical ductal hyperplasia (ADH).    BREAST BIOMARKER RESULTS  Estrogen receptor (ER):  Positive (nearly 100%, strong)  Progesterone receptor (AR):  Positive (90%, moderate to strong)  HER2 IHC:  Negative (1+)  Ki-67 proliferation index:  25%      2. RIGHT BREAST, MASS AT 1 O'CLOCK 6 CM FROM NIPPLE, BIOPSY:  Invasive ductal carcinoma (no special type), [place] histologic grade 1  (tubules: 2, nuclear:  1, mitoses: 1).  Largest continuous invasive focus measures 5 mm.    BREAST BIOMARKER RESULTS  Estrogen receptor (ER):  Positive (95%, strong)  Progesterone receptor (FL):  Positive (30%, weak to moderate)  HER2 IHC:  Equivocal (2+); reflex to Dual JENNIFER pending  Ki-67 proliferation index:  10%      3. RIGHT BREAST, MASS AT 11 O'CLOCK 5 CM FROM NIPPLE, BIOPSY:  Invasive ductal carcinoma (no special type), [place] histologic grade 1 (tubules: 3, nuclear:  1, mitoses: 1).  Largest continuous invasive focus measures 12 mm.    BREAST BIOMARKER RESULTS  Estrogen receptor (ER):  Positive (nearly 100%, strong)  Progesterone receptor (FL):  Positive (90%, moderate to strong)  HER2 IHC:  Equivocal (2+); reflex to Dual JENNIFER pending  Ki-67 proliferation index:  20%      4. RIGHT BREAST, MASS AT 11 O'CLOCK 1 CM FROM NIPPLE, BIOPSY:  Invasive ductal carcinoma (no special type), [place] histologic grade 1 (tubules: 2, nuclear pleomorphism: 2, mitotic activity:  1)  Largest continuous invasive focus measures 12 mm.  Ductal carcinoma in situ (DCIS), intermediate nuclear grade, cribriform type with comedonecrosis.     Tumor size: 2.4 cm   Tumor ndgndrndanddndend:nd nd2nd Estrogen Receptor: +   Progesterone Receptor: +   Her-2 ran: -   Lymph node status: clinically negative   Lymphatic invasion: clinically negative     GYN History:  Age of menarche was 11. [age] men[age].  Last menstrual period was 6 year ago. Patient admits to hormonal therapy. Patient is . Patient did breast feed.    Past Medical History:   Diagnosis Date    Abnormal Pap smear of cervix age 20    Abnormal Pap smear of vagina     pt was 20 yr of age    Hypertension      Past Surgical History:   Procedure Laterality Date     SECTION      ENDOMETRIAL ABLATION      TUBAL LIGATION       Current Outpatient Medications on File Prior to Visit   Medication Sig Dispense Refill    atenolol (TENORMIN) 50 MG tablet 75 mg.   3    citalopram  (CELEXA) 10 MG tablet Take 1 tablet (10 mg total) by mouth once daily. 30 tablet 2    diazePAM (VALIUM) 2 MG tablet Take 1 tablet (2 mg total) by mouth every 8 (eight) hours as needed for Anxiety. 10 tablet 0    ezetimibe (ZETIA) 10 mg tablet Take 10 mg by mouth.      hydroCHLOROthiazide (HYDRODIURIL) 25 MG tablet Take 1 tablet (25 mg total) by mouth once daily. 90 tablet 3     No current facility-administered medications on file prior to visit.     Social History     Socioeconomic History    Marital status:    Tobacco Use    Smoking status: Never    Smokeless tobacco: Never   Substance and Sexual Activity    Alcohol use: Never    Drug use: Never    Sexual activity: Yes     Partners: Male     Birth control/protection: Post-menopausal     Comment:      Social Determinants of Health     Financial Resource Strain: Low Risk  (9/18/2024)    Overall Financial Resource Strain (CARDIA)     Difficulty of Paying Living Expenses: Not hard at all   Food Insecurity: No Food Insecurity (9/18/2024)    Hunger Vital Sign     Worried About Running Out of Food in the Last Year: Never true     Ran Out of Food in the Last Year: Never true   Physical Activity: Insufficiently Active (9/18/2024)    Exercise Vital Sign     Days of Exercise per Week: 2 days     Minutes of Exercise per Session: 60 min   Stress: Stress Concern Present (9/18/2024)    [employer] [employer] - Occupational Stress Questionnaire     Feeling of Stress : To some extent   Housing Stability: Unknown (9/18/2024)    Housing Stability Vital Sign     Unable to Pay for Housing in the Last Year: No     Family History   Problem Relation Name Age of Onset    Breast cancer Sister      Colon cancer Neg Hx      Ovarian cancer Neg Hx          Review of Systems   Constitutional:  Negative for appetite change, chills, fever and unexpected weight change.   HENT:  Negative for facial swelling, postnasal drip and sore throat.    Eyes:  Negative for  "redness and itching.   Respiratory:  Negative for chest tightness and shortness of breath.    Cardiovascular:  Negative for chest pain and palpitations.   Gastrointestinal:  Negative for abdominal pain, blood in stool, diarrhea, nausea and vomiting.   Genitourinary:  Negative for difficulty urinating and dysuria.   Musculoskeletal:  Negative for arthralgias, back pain and joint swelling.   Skin:  Positive for color change (bruising). Negative for rash and wound.   Neurological:  Negative for dizziness and syncope.   Hematological:  Negative for adenopathy.   Psychiatric/Behavioral:  Negative for agitation. The patient is not nervous/anxious.         Increased emotions since stopping HRT     Objective:   BP (!) 145/77   Pulse 60   Ht 5' 4" (1.626 m)   Wt 68 kg (150 lb)   LMP 02/05/2016   BMI 25.75 kg/m²     Physical Exam   HENT:   Head: Normocephalic and atraumatic.   Eyes: Conjunctivae are normal. No scleral icterus.   Cardiovascular:  Normal rate, regular rhythm and normal pulses.            Pulmonary/Chest: Effort normal and breath sounds normal. No accessory muscle usage or stridor. No respiratory distress. She has no wheezes. Right breast exhibits mass and skin change (bruising). Right breast exhibits no inverted nipple, no nipple discharge and no tenderness. Left breast exhibits no inverted nipple, no mass, no nipple discharge, no skin change and no tenderness.   Right breast with 3cm mass at the11 oclock position. Diffuse bruising on the right breast. About 1 cm round mobile firm mass in the Right axillary tail, consistent with FA.        Abdominal: Soft. Normal appearance. She exhibits no mass.   Musculoskeletal: No deformity. Lymphadenopathy:      Cervical: No cervical adenopathy.      Upper Body:      Right upper body: No supraclavicular or axillary adenopathy.      Left upper body: No supraclavicular or axillary adenopathy.     Neurological: She is alert.   Skin: Skin is warm and dry.     Psychiatric: " Mood and judgment normal.       Radiology review: Images personally reviewed by me in the clinic.     Mammogram:  Right breast (contrast enhanced mammo and ultrasound):      Solid hypoechoic irregularly lobulated 2.2 x 1.7 cm enhancing mass within posterior depth of the upper outer right breast is highly suspicious for malignancy.      Second 6 mm enhancing solid mass 12 o'clock position posterior depth right breast, also highly suspicious for malignancy.      Within the lateral retroareolar aspect of right breast there is a 3rd lesion.  On ultrasound, the lesion measures approximately 1.4 cm in greatest dimension with acircumscribed lobulated border, mixed echogenicity and through transmission.  This lesion mildly enhances and is indeterminate.      Right axilla ultrasound: A few small sonographically normal appearing lymph nodes detected along with a 1.1 x 1.1 x 0.9 cm lymph node which is not significantly enlarged, though demonstrates cortical thickening and no identifiable fatty hilum, possible pathologic node.      Left breast (contrast enhance mammo and ultrasound):      Broad region of non mass parenchymal enhancement within the superior aspect of the left breast with a subcentimeter nodular focus of relative increased attenuation on the lateral projection.  Ultrasound of the entire superior aspect of the left breast demonstrates a single 7 x 5 mm hypoechoic solid nodule with blood flow within the upper slight lateral left breast most likely corresponding to the subcentimeter nodular focus of enhancement on contrast enhanced mammography.  No other focal lesion detected within the superior aspect of the left breast on ultrasound.      IMPRESSION:   2.2 cm spiculated mass deep upper outer right breast highly suspicious for malignancy     6 mm enhancing nodule 12:00 o'clock right breast posterior depth, highly suspicious for malignancy      Indeterminate 1.4 cm mildly enhancing 3rd lesion within the anterior  aspect of the right breast     Nonenlarged abnormal appearing right axillary lymph node, possible metastatic disease      7 mm solid enhancing lesion upper outer LEFT breast suspicious for malignancy              Three right breast lesions, abnormal right axillary lymph node, single left breast lesion      Birads 5    Ultrasound:    Right breast (contrast enhanced mammo and ultrasound):      Solid hypoechoic irregularly lobulated 2.2 x 1.7 cm enhancing mass within posterior depth of the upper outer right breast is highly suspicious for malignancy.      Second 6 mm enhancing solid mass 12 o'clock position posterior depth right breast, also highly suspicious for malignancy.      Within the lateral retroareolar aspect of right breast there is a 3rd lesion.  On ultrasound, the lesion measures approximately 1.4 cm in greatest dimension with acircumscribed lobulated border, mixed echogenicity and through transmission.  This lesion mildly enhances and is indeterminate.      Right axilla ultrasound: A few small sonographically normal appearing lymph nodes detected along with a 1.1 x 1.1 x 0.9 cm lymph node which is not significantly enlarged, though demonstrates cortical thickening and no identifiable fatty hilum, possible pathologic node.      Left breast (contrast enhance mammo and ultrasound):      Broad region of non mass parenchymal enhancement within the superior aspect of the left breast with a subcentimeter nodular focus of relative increased attenuation on the lateral projection.  Ultrasound of the entire superior aspect of the left breast demonstrates a single 7 x 5 mm hypoechoic solid nodule with blood flow within the upper slight lateral left breast most likely corresponding to the subcentimeter nodular focus of enhancement on contrast enhanced mammography.  No other focal lesion detected within the superior aspect of the left breast on ultrasound.      IMPRESSION:   2.2 cm spiculated mass deep upper outer right  breast highly suspicious for malignancy     6 mm enhancing nodule 12:00 o'clock right breast posterior depth, highly suspicious for malignancy      Indeterminate 1.4 cm mildly enhancing 3rd lesion within the anterior aspect of the right breast     Nonenlarged abnormal appearing right axillary lymph node, possible metastatic disease      7 mm solid enhancing lesion upper outer LEFT breast suspicious for malignancy              Three right breast lesions, abnormal right axillary lymph node, single left breast lesion      Birads 5    MRI:  Left  There is a 0.6 cm x 0.6 cm x 0.5 cm irregularly shaped mass with irregular margins seen in the lower outer quadrant of the left breast in the posterior depth, 11 cm from the nipple. Kinetics initial phase is slow. Delayed phase is plateau.  This is best appreciated on axial postcontrast image 26 of 128.   There is a 0.8 cm homogeneous, non-mass enhancement in a linear distribution seen in the upper inner quadrant of the left breast in the middle depth. Kinetics initial phase is slow. Delayed phase is plateau.  This is best appreciated on axial postcontrast image 70 of 128.      Right  There is a 2.4 cm x 2.2 cm x 2.6 cm irregularly shaped mass with irregular margins and heterogeneous internal enhancement seen in the upper outer quadrant of the right breast in the posterior depth, 6.9 cm from the nipple, 1.2 cm from the skin, and 5.5 cm from the chest wall. The mass correlates with the prior mammogram finding and ultrasound finding. Kinetics initial phase is fast. Delayed phase is washout.   There is a 0.7 cm x 0.6 cm x 0.9 cm irregularly shaped mass with irregular margins and homogeneous internal enhancement seen in the upper inner quadrant of the right breast in the middle depth, 7.5 cm from the nipple. The mass correlates with the prior mammogram finding and ultrasound finding. Kinetics initial phase is fast. Delayed phase is washout.   There is a 1.1 cm x 0.8 cm x 1.4 cm  oval mass with heterogeneous internal enhancement seen in the outer central region of the right breast in the anterior depth. The mass correlates with the prior mammogram finding and ultrasound finding. Kinetics initial phase is fast. Delayed phase is washout.  There is a 4 mm enhancing mass positioned directly anterior and medial, which is also suspicious of malignancy.   There is a 1 cm clumped, non-mass enhancement in a focal distribution seen in the axillary tail region of the right breast.      Impression:  Left Breast:     Mass: Left breast 0.6 cm x 0.6 cm x 0.5 cm mass at the lower outer position and posterior depth. Assessment: 4 - Suspicious finding. Biopsy is recommended.  A correlate was not definitively established on the outside mammographic and ultrasound examinations.  Additional evaluation with ultrasound is recommended.  If a correlate is established, an ultrasound-guided core needle biopsy is recommended.  If a correlate is not established, an MRI guided biopsy is recommended.        Non-mass Enhancement: Left breast 0.8 cm non-mass enhancement at the upper inner position and middle depth. Assessment: 4 - Suspicious finding.  This area can be expectantly managed pending the biopsy results.  If warranted for surgical planning, an MRI guided biopsy is recommended.     Right Breast:  Overall, findings are highly suspicious for multifocal/multicentric right breast malignancy with findings as follows:     Mass: Right breast 2.4 cm x 2.2 cm x 2.6 cm mass at the upper outer position and posterior depth. Assessment: 5 - Highly suggestive of malignancy. Biopsy is recommended.  This mass was appreciated on the prior mammographic and ultrasound examinations.  An ultrasound-guided core needle biopsy is recommended.     Mass: Right breast 0.7 cm x 0.6 cm x 0.9 cm mass at the upper inner position and middle depth. Assessment: 5 - Highly suggestive of malignancy. Biopsy is recommended. This mass was appreciated  on the prior mammographic and ultrasound examinations.  An ultrasound-guided core needle biopsy is recommended.     Mass: Right breast 1.1 cm x 0.8 cm x 1.4 cm mass at the outer central position and anterior depth. Assessment: 5 - Highly suggestive of malignancy. Biopsy is recommended. This mass was appreciated on the prior mammographic and ultrasound examinations.  An ultrasound-guided core needle biopsy is recommended.     Non-mass Enhancement: Right breast 1 cm non-mass enhancement at the axillary tail position. Assessment: 0 - Incomplete. Diagnostic Mammogram and/or Ultrasound is recommended.  The need for biopsy will be determined at that time.     BI-RADS Category:   Overall: 5 - Highly Suggestive of Malignancy  Assessment:       1. 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, estrogen receptor positive    2. Pre-op testing        Plan:     Options for management were discussed with the patient and her family. We reviewed the existing data noting the equivalency of breast conserving surgery with radiation therapy and mastectomy. We also reviewed the guidelines of the National Comprehensive Cancer Network for multifocal and bilateral breast carcinoma. We discussed the need for lumpectomy margins to be negative for carcinoma, the necessity for postoperative radiation therapy after breast conservation in most cases, the possibility of a failed or false negative sentinel lymph node biopsy and the potential need for complete lymphadenectomy for a failed or positive sentinel lymph node biopsy were fully discussed. In the setting of mastectomy, delayed or immediate reconstruction options are available and were discussed.     In the setting of lumpectomy, radiation therapy would be recommended majority of the time.  The duration and treatment side effects were discussed with the patient.  This will coordinated with the radiation oncologist pending final pathology.    We also discussed the role  of systemic therapy in the treatment of early stage breast cancer.  We discussed that this is based on tumor biology and sondra status and will be determined based on final pathology.  We discussed that if the cancer is hormone positive, endocrine therapy would be recommended in most cases and its use can reduce the risk of recurrence as well as improve survival. Side effects of treatment were briefly discussed. We also discussed the potential role for chemotherapy based on a number of factors such as tumor phenotype (ER+ vs. triple negative vs. [id number]+) and this would be determined in coordination with the medical oncologist.    The patient, in consultation with her family, has elected to proceed with bilateral total mastectomy and sentinel lymph node biopsy. She is not interested in any form of reconstruction - we discussed options at length.  She has done research and talked with several people at a breast support group about this and feels comfortable without reconstruction.  The operative risks of bleeding, infection, recurrence, scarring, and anesthetic complications and the possibility of requiring further surgery were all noted.    Surgery scheduled. Follow-up in clinic roughly 14 days after surgery.     Patient was educated on breast cancer, receptors, wire localization lumpectomy, mastectomy, sentinel lymph node mapping and biopsy, axillary lymph node dissection, reconstruction, breast prosthesis with post-mastectomy bra and radiation therapy. Patient was given patient information binder including [facility] breast cancer treatment brochure.  All her questions were answered.    Total time spent with the patient: 65 minutes.  45 minutes of face to face consultation and 20 minutes of chart review and coordination of care.

## 2024-09-25 ENCOUNTER — PATIENT MESSAGE (OUTPATIENT)
Dept: HEMATOLOGY/ONCOLOGY | Facility: CLINIC | Age: 59
End: 2024-09-25
Payer: COMMERCIAL

## 2024-09-25 ENCOUNTER — TELEPHONE (OUTPATIENT)
Dept: HEMATOLOGY/ONCOLOGY | Facility: CLINIC | Age: 59
End: 2024-09-25
Payer: COMMERCIAL

## 2024-09-25 ENCOUNTER — TELEPHONE (OUTPATIENT)
Dept: SURGERY | Facility: CLINIC | Age: 59
End: 2024-09-25
Payer: COMMERCIAL

## 2024-09-25 DIAGNOSIS — D72.820 LYMPHOCYTOSIS: Primary | ICD-10-CM

## 2024-09-25 LAB — PATH REV BLD -IMP: NORMAL

## 2024-09-25 RX ORDER — SODIUM CHLORIDE 9 MG/ML
INJECTION, SOLUTION INTRAVENOUS CONTINUOUS
OUTPATIENT
Start: 2024-09-25

## 2024-09-25 RX ORDER — CEFAZOLIN SODIUM 2 G/50ML
2 SOLUTION INTRAVENOUS
OUTPATIENT
Start: 2024-09-25

## 2024-09-25 NOTE — TELEPHONE ENCOUNTER
Called and spoke to pt about lymphocytosis and blood smear results requiring additional testing. She is scheduled for flow cytometry on Monday 09/30. She is seeing her Oncologist on Tuesday.

## 2024-09-25 NOTE — TELEPHONE ENCOUNTER
LVM in regards to scheduling Integrative Referral placed by Dr. Ileana Patel.  MA instructed pt. in voicemail to call the office number for scheduling.         ANDREAS MURRAY ext [phone]

## 2024-09-26 ENCOUNTER — PATIENT MESSAGE (OUTPATIENT)
Dept: SURGERY | Facility: CLINIC | Age: 59
End: 2024-09-26
Payer: COMMERCIAL

## 2024-09-28 ENCOUNTER — PATIENT MESSAGE (OUTPATIENT)
Dept: SURGERY | Facility: CLINIC | Age: 59
End: 2024-09-28
Payer: COMMERCIAL

## 2024-09-30 ENCOUNTER — LAB VISIT (OUTPATIENT)
Dept: LAB | Facility: HOSPITAL | Age: 59
End: 2024-09-30
Attending: ORTHOPAEDIC SURGERY
Payer: COMMERCIAL

## 2024-09-30 DIAGNOSIS — D72.820 LYMPHOCYTOSIS: ICD-10-CM

## 2024-09-30 PROCEDURE — 88185 FLOWCYTOMETRY/TC ADD-ON: CPT | Mod: 59 | Performed by: PATHOLOGY

## 2024-09-30 PROCEDURE — 88184 FLOWCYTOMETRY/ TC 1 MARKER: CPT | Performed by: PATHOLOGY

## 2024-09-30 PROCEDURE — 88189 FLOWCYTOMETRY/READ 16 & >: CPT | Mod: ,,, | Performed by: PATHOLOGY

## 2024-10-01 LAB
FLOW CYTOMETRY ANTIBODIES ANALYZED - BLOOD: NORMAL
FLOW CYTOMETRY COMMENT - BLOOD: NORMAL
FLOW CYTOMETRY INTERPRETATION - BLOOD: NORMAL
INTEGRATED BRAC ANALYSIS: NORMAL

## 2024-10-02 ENCOUNTER — PATIENT MESSAGE (OUTPATIENT)
Dept: SURGERY | Facility: CLINIC | Age: 59
End: 2024-10-02
Payer: COMMERCIAL

## 2024-10-02 ENCOUNTER — TELEPHONE (OUTPATIENT)
Dept: SURGERY | Facility: CLINIC | Age: 59
End: 2024-10-02
Payer: COMMERCIAL

## 2024-10-02 ENCOUNTER — TELEPHONE (OUTPATIENT)
Dept: PLASTIC SURGERY | Facility: CLINIC | Age: 59
End: 2024-10-02
Payer: COMMERCIAL

## 2024-10-02 DIAGNOSIS — C50.412 MALIGNANT NEOPLASM OF UPPER-OUTER QUADRANT OF BOTH BREASTS IN FEMALE, ESTROGEN RECEPTOR POSITIVE: Primary | ICD-10-CM

## 2024-10-02 DIAGNOSIS — Z17.0 MALIGNANT NEOPLASM OF UPPER-OUTER QUADRANT OF BOTH BREASTS IN FEMALE, ESTROGEN RECEPTOR POSITIVE: Primary | ICD-10-CM

## 2024-10-02 DIAGNOSIS — C50.411 MALIGNANT NEOPLASM OF UPPER-OUTER QUADRANT OF BOTH BREASTS IN FEMALE, ESTROGEN RECEPTOR POSITIVE: Primary | ICD-10-CM

## 2024-10-02 NOTE — TELEPHONE ENCOUNTER
Genetic Lay Navigation Note:    Called patient with the results of her genetic testing. Informed patient that results were negative for any notable mutation. Also, explained to the patient that she is at an elevated risk for Colorectal Cancer due to family history. Instructed patient that we would ensure she received a copy of her results via [facility] or mail, and to call us with any questions or concerns regarding the full report. Patient verbalized understanding to all information, no questions at this time.     Patient's ordering physician made aware of results and that patient was informed of them.

## 2024-10-02 NOTE — TELEPHONE ENCOUNTER
Spoke to pt and scheduled appt for a consult to see Dr Lorenz  at [facility] [address], [address]. [address].      Provided patient with appointment time and date, address of the location and call back # to RN navigator. All questions and concerns addressed. Pt verbalized understanding.

## 2024-10-03 ENCOUNTER — TELEPHONE (OUTPATIENT)
Dept: HEMATOLOGY/ONCOLOGY | Facility: CLINIC | Age: 59
End: 2024-10-03
Payer: COMMERCIAL

## 2024-10-03 ENCOUNTER — TELEPHONE (OUTPATIENT)
Dept: SURGERY | Facility: CLINIC | Age: 59
End: 2024-10-03
Payer: COMMERCIAL

## 2024-10-03 NOTE — TELEPHONE ENCOUNTER
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    Pt follows with oncologist, Dr. Shaffer and plans to follow-up with her for workup/ management.    At this time, we will continue with our treatment plan for her breast cancer, unless otherwise recommended by Dr. Shaffer.     All questions were answered.

## 2024-10-03 NOTE — TELEPHONE ENCOUNTER
Spoke with patient in regards to scheduling appointment for Integrative consult. Patient reported she contact office back for scheduling

## 2024-10-08 ENCOUNTER — PATIENT MESSAGE (OUTPATIENT)
Dept: SURGERY | Facility: CLINIC | Age: 59
End: 2024-10-08
Payer: COMMERCIAL

## 2024-10-14 ENCOUNTER — HOSPITAL ENCOUNTER (OUTPATIENT)
Dept: PREADMISSION TESTING | Facility: OTHER | Age: 59
Discharge: HOME | End: 2024-10-14
Attending: SURGERY
Payer: COMMERCIAL

## 2024-10-14 ENCOUNTER — ANESTHESIA EVENT (OUTPATIENT)
Dept: SURGERY | Facility: OTHER | Age: 59
End: 2024-10-14
Payer: COMMERCIAL

## 2024-10-14 ENCOUNTER — CLINICAL SUPPORT (OUTPATIENT)
Dept: REHABILITATION | Facility: HOSPITAL | Age: 59
End: 2024-10-14
Attending: SURGERY
Payer: COMMERCIAL

## 2024-10-14 VITALS
RESPIRATION RATE: 18 BRPM | HEART RATE: 64 BPM | SYSTOLIC BLOOD PRESSURE: 168 MMHG | WEIGHT: 150 LBS | OXYGEN SATURATION: 99 % | HEIGHT: 64 IN | DIASTOLIC BLOOD PRESSURE: 74 MMHG | TEMPERATURE: 98 F | BODY MASS INDEX: 25.61 KG/M2

## 2024-10-14 DIAGNOSIS — Z17.0 MALIGNANT NEOPLASM OF UPPER-OUTER QUADRANT OF BOTH BREASTS IN FEMALE, ESTROGEN RECEPTOR POSITIVE: ICD-10-CM

## 2024-10-14 DIAGNOSIS — Z01.818 PRE-OP TESTING: ICD-10-CM

## 2024-10-14 DIAGNOSIS — Z91.89 AT RISK FOR LYMPHEDEMA: Primary | ICD-10-CM

## 2024-10-14 DIAGNOSIS — C50.412 MALIGNANT NEOPLASM OF UPPER-OUTER QUADRANT OF BOTH BREASTS IN FEMALE, ESTROGEN RECEPTOR POSITIVE: Primary | ICD-10-CM

## 2024-10-14 DIAGNOSIS — C50.411 MALIGNANT NEOPLASM OF UPPER-OUTER QUADRANT OF BOTH BREASTS IN FEMALE, ESTROGEN RECEPTOR POSITIVE: Primary | ICD-10-CM

## 2024-10-14 DIAGNOSIS — Z17.0 MALIGNANT NEOPLASM OF UPPER-OUTER QUADRANT OF BOTH BREASTS IN FEMALE, ESTROGEN RECEPTOR POSITIVE: Primary | ICD-10-CM

## 2024-10-14 DIAGNOSIS — C50.412 MALIGNANT NEOPLASM OF UPPER-OUTER QUADRANT OF BOTH BREASTS IN FEMALE, ESTROGEN RECEPTOR POSITIVE: ICD-10-CM

## 2024-10-14 DIAGNOSIS — C50.411 MALIGNANT NEOPLASM OF UPPER-OUTER QUADRANT OF BOTH BREASTS IN FEMALE, ESTROGEN RECEPTOR POSITIVE: ICD-10-CM

## 2024-10-14 PROCEDURE — 97535 SELF CARE MNGMENT TRAINING: CPT

## 2024-10-14 PROCEDURE — 97161 PT EVAL LOW COMPLEX 20 MIN: CPT

## 2024-10-14 PROCEDURE — 93005 ELECTROCARDIOGRAM TRACING: CPT

## 2024-10-14 PROCEDURE — 93010 ELECTROCARDIOGRAM REPORT: CPT | Mod: ,,, | Performed by: INTERNAL MEDICINE

## 2024-10-14 RX ORDER — LIDOCAINE HYDROCHLORIDE 10 MG/ML
0.5 INJECTION, SOLUTION EPIDURAL; INFILTRATION; INTRACAUDAL; PERINEURAL ONCE
OUTPATIENT
Start: 2024-10-14 | End: 2024-10-14

## 2024-10-14 RX ORDER — SCOLOPAMINE TRANSDERMAL SYSTEM 1 MG/1
1 PATCH, EXTENDED RELEASE TRANSDERMAL ONCE
OUTPATIENT
Start: 2024-10-14 | End: 2024-10-14

## 2024-10-14 RX ORDER — SODIUM CHLORIDE, SODIUM LACTATE, POTASSIUM CHLORIDE, CALCIUM CHLORIDE 600; 310; 30; 20 MG/100ML; MG/100ML; MG/100ML; MG/100ML
INJECTION, SOLUTION INTRAVENOUS CONTINUOUS
OUTPATIENT
Start: 2024-10-14

## 2024-10-14 NOTE — PLAN OF CARE
OUTPATIENT PHYSICAL THERAPY   PRE-OP EVALUATION    Name: Coco Roger  Clinic Number: [id number]    Therapy Diagnosis:   Encounter Diagnoses   Name Primary?    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, estrogen receptor positive     At risk for lymphedema Yes        Physician: Ileana Patel MD    Physician Orders: PT Eval and Treat   Medical Diagnosis from Referral: C50.411,Z17.0,C50.412 (ICD-10-CM) - 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, estrogen receptor positive   Evaluation Date: 10/14/2024  Authorization Period Expiration: 9/4/2025  Plan of Care Expiration: 10/14/2024  Progress Note Due: N/A  Visit # / Visits authorized: 1/ 1   FOTO: 1/3    Precautions: Standard and cancer     Time In: 12:20 pm  Time Out: 1:00 pm  Total Appointment Time (timed & untimed codes): 40 minutes    History   History of Present Illness: Coco is a 59 y.o. female that presents to  [facility] at the [facility] clinic secondary to dx of bilateral breast cancer.    Dx:  bilateral IDC. Left sided ER+, AK + HER -. R sided ER+ AK+ HER -   Surgery date: 10/30/24 B mastectomy, SLNB      Pt presents today for baseline measurements to aid in the early detection of lymphedema, UE muscle testing, postural and ROM assessment along with education of risk of lymphedema and surgical precautions post surgery. Circumferential measurements will be taken today of BL UEs for early detection of lymphedema post surgery. Pt will also be instructed in exercises to perform pre and post-surgery to insure best outcomes.     Past Medical History:   Past Medical History:   Diagnosis Date    Abnormal Pap smear of cervix age 20    Abnormal Pap smear of vagina     pt was 20 yr of age    Hypertension     PONV (postoperative nausea and vomiting)        Past Surgical History:   Coco Roger  has a past surgical history that includes Endometrial ablation; Tubal  "ligation;  section; Tonsillectomy; and Cystoscopy.    Medications:  Coco has a current medication list which includes the following prescription(s): atenolol, citalopram, diazepam, ezetimibe, and hydrochlorothiazide.    Allergies:  Review of patient's allergies indicates:   Allergen Reactions    Bactrim [sulfamethoxazole-trimethoprim] Hives    Epinephrine      shaking          Hand dominance: right  Prior Therapy: none    Social History: lives family  Place of Residence (Steps/Adaptations): two story home, bedroom on first floor, two steps to enter  DME owned: none  Current functional status:  independent  Exercise routine prior to onset : guerline 2x a week  Work:  [employer]                         Subjective   Pt states: no pain  Pain: 0/10 on VAS.     Objective   Mental status: alert & oriented x 3    Posture/Alignment   Postural examination/scapular alignment: rounded shoulders    Nutrition:  Normal    Skin integrity: intact  Edema: none noted    Sensation: Light Touch: Intact           Proprioception: Intact  Appearance: well groomed     ROM:   UPPER EXTREMITY--AROM/PROM  (R) UE: WNLs  (L) UE: WNLs     Shoulder Range of Motion:   ACTIVE ROM RIGHT LEFT   Flexion 175 175   Abduction 180 180   Extension 70 70   IR/90deg 90 90   ER/90deg 90 90     Strength: manual muscle test grades below   Upper Extremity Strength   RIGHT UE LEFT UE   Shoulder flexion: 5/5 5/5   Shoulder Abduction: 5/5 5/5   Shoulder IR 5/5 5/5   Shoulder ER 5/5 5/5   Elbow flexion: 5/5 5/5   Elbow extension: 5/5 5/5   Wrist flexion: 4+/5 4+/5   Wrist extension: 5/5 5/5    60.6 lbs 49.6 lbs       Baseline measurements of bilateral upper extremities for early detection of lymphedema:     LANDMARK RIGHT UPPER EXTREMITY LEFT UPPER EXTREMITY DIFFERENCE   E + 8" 35 cm 33.5 cm 1.5 cm   E + 6" 33 cm 32 cm 1.0 cm   E + 4" 30 cm 30 cm 0 cm   E + 2" 29.5 cm 28.5 cm 1.0 cm   Elbow 26 cm 25 cm 1.0 cm   W+ 8" 26 cm 25 cm 1.0 cm   W +  6" 25.5 cm " "24.5 cm 1.0 cm   W + 4" 21 cm 20.5 cm 0.5 cm   Wrist  16.5 cm 15 cm 1.5 cm   DPC 19.5 cm 19.5 cm 0 cm   IP Thumb 6.0 cm 6.5 cm 0.5 cm       Endurance Assessment:   Evaluation   30 second Chair Rise  (adults > 59 y/o) 14 completed with no arms       Coordination:   - fine motor: within functional limits  - UE coordination: intact     - LE coordination:  Not tested     Functional Mobility (Bed mobility, transfers)  Bed mobility: I =  independent   Roll to left: I  Roll to right: I  Supine to prone: I  Scooting to edge of bed: I  Supine to sit: I  Sit to supine: I  Transfers to bed: I  Transfers to toilet: I  Sit to stand:  I  Stand pivot:  I  Car transfers: I      ADL's:  Feeding: I = independent   Grooming: I  Hygiene: I  UB Dressing: I  LB Dressing: I  Toileting: I  Bathing: I    Gait Assessment:   - Assistive device used: none  - Assistance: independent  - Distance: community distances       Functional Limitations Reporting        Intake Outcome Measure for FOTO Shoulder Survey    Therapist reviewed FOTO scores for Coco Roger on 10/14/2024.   FOTO documents entered into Beep - see Media section.    Intake Score: 101%           Pt has no cultural, educational or language barriers to learning provided.    Treatment and Patient Education     Total Time Separate from Evaluation: 15 minutes    Patient participated in self care/home management for 10 minutes including the following:     - Role of PT in multi - disciplinary team, goals for PT  - Pt was educated in lymphedema etiology and management plans.    - Pt was provided with written risk reductions and precautions for managing lymphedema.   - Reviewed CHARLINE drain care instructions.     ROM/lifting Precautions post surgery discussed -  until drains have been removed at your post operative visit with your surgeon:  - do not lift affected arm above 90 degrees of shoulder flexion bilaterally  - do not lift over 5 lbs  - do not pull or push heavy objects  - do not " sleep on your stomach or surgery side     Pt was instructed in and performed therapeutic exercise for 5 minutes for postural correction and alignment, stretching and soft tissue mobility.   Exercises included:   - exaggerated deep breathing and relaxation  - scapular retractions  - fist making  - elbow flexion/extension    Pt was able to demonstrate and report understanding and performance    Written Home Exercises Provided: yes.  Exercises were reviewed and Coco was able to demonstrate them prior to the end of the session.  Coco demonstrated good  understanding of the education provided.     See EMR under Patient Instructions for exercises provided 10/14/2024.      Assessment   This is a 59 y.o. female referred to outpatient physical therapy and presents with a medical diagnosis of bilateral breast cancer and was seen today pre-operatively to assess strength and ROM of BL UEs, to take baseline circumferential measurements of BL UEs to aid in the early detection of lymphedema and provide pt education on exercises/precations post breast surgery. Pt does not exhibit any ROM impairments  Pt educated in lymphedema risks/precautions as well as ROM/lifting precautions post surgery - pt demonstrated/verbalized understanding. No goals established this visit as goals for PT will be established post surgery at follow up.      Anticipated barriers to physical therapy: none anticipated     Pt's spiritual, cultural and educational needs considered and pt agreeable to plan of care and goals as stated below:     Medical Necessity is demonstrated by the following  History  Co-morbidities and personal factors that may impact the plan of care [] LOW: no personal factors / co-morbidities  [x] MODERATE: 1-2 personal factors / co-morbidities  [] HIGH: 3+ personal factors / co-morbidities    Moderate / High Support Documentation:   Co-morbidities affecting plan of care: history of cancer    Personal Factors:   no deficits      Examination  Body Structures and Functions, activity limitations and participation restrictions that may impact the plan of care [x] LOW: addressing 1-2 elements  [] MODERATE: 3+ elements  [] HIGH: 4+ elements (please support below)    Moderate / High Support Documentation: N/A     Clinical Presentation [] LOW: stable  [x] MODERATE: Evolving  [] HIGH: Unstable     Decision Making/ Complexity Score: moderate           Plan   Schedule patient for follow up with Physical therapy post surgery. Goals for therapy post surgery will be established at that time.     Therapist: Beryl Henderson, PT  10/15/2024

## 2024-10-14 NOTE — DISCHARGE INSTRUCTIONS
Information to Prepare you for your Surgery    PRE-ADMIT TESTING -  [phone]    [address]  [facility]          Your surgery has been scheduled at [facility]. We are pleased to have the opportunity to serve you. For Further Information please call [phone].    On the day of surgery please report to the Information Desk on the 1st floor.    CONTACT YOUR PHYSICIAN'S OFFICE THE DAY PRIOR TO YOUR SURGERY TO OBTAIN YOUR ARRIVAL TIME.     The evening before surgery do not eat anything after 9 p.m. ( this includes hard candy, chewing gum and mints).  You may only have GATORADE, POWERADE AND WATER  from 9 p.m. until you leave your home.   DO NOT DRINK ANY LIQUIDS ON THE WAY TO THE HOSPITAL.      Why does your anesthesiologist allow you to drink Gatorade/Powerade before surgery?  Gatorade/Powerade helps to increase your comfort before surgery and to decrease your nausea after surgery. The carbohydrates in Gatorade/Powerade help reduce your body's stress response to surgery.  If you are a diabetic-drink only water prior to surgery.    Outpatient Surgery- May allow 2 adult (18 and older) Support Persons (1 being the designated ) for all surgical/procedural patients. A breastfeeding mother will be allowed her infant and 2 adult Support Persons. No one under the age of 18 will be allowed in the building. No swapping out of visitors in the [facility].      SPECIAL MEDICATION INSTRUCTIONS: TAKE medications checked off by the Anesthesiologist on your Medication List.    Angiogram Patients: Take medications as instructed by your physician, including aspirin.     Surgery Patients:    If you take ASPIRIN - Your PHYSICIAN/SURGEON will need to inform you IF/OR when you need to stop taking aspirin prior to your surgery.     The week prior to surgery do not ot take any medications containing IBUPROFEN or NSAIDS ( Advil, Motrin, Goodys, BC, Aleve, Naproxen etc)  If you are not sure if you should take a medicine please call your surgeon's office.  Ok to take Tylenol    Do Not Wear any make-up (especially eye make-up) to surgery. Please remove any false eyelashes or eyelash extensions. If you arrive the day of surgery with makeup/eyelashes on you will be required to remove prior to surgery. (There is a risk of corneal abrasions if eye makeup/eyelash extensions are not removed)      Leave all valuables at home.   Do Not wear any jewelry or watches, including any metal in body piercings. Jewelry must be removed prior to coming to the hospital.  There is a possibility that rings that are unable to be removed may be cut off if they are on the surgical extremity.    Please remove all hair extensions, wigs, clips and any other metal accessories/ ornaments from your hair.  These items may pose a flammable/fire risk in Surgery and must be removed.    Do not shave your surgical area at least 5 days prior to your surgery. The surgical prep will be performed at the hospital according to Infection Control regulations.    Contact Lens must be removed before surgery. Either do not wear the contact lens or bring a case and solution for storage.  Please bring a container for eyeglasses or dentures as required.  Bring any paperwork your physician has provided, such as consent forms,  history and physicals, doctor's orders, etc.   Bring comfortable clothes that are loose fitting to wear upon discharge. Take into consideration the type of surgery being performed.  Maintain your diet as advised per your physician the day prior to surgery.      Adequate rest the night before surgery is advised.   Park in the Parking lot behind the hospital or in the [facility] across the street from the parking lot. Parking is complimentary.  If you will be discharged the same day as your procedure, please arrange for a responsible adult to drive you home or to accompany you if traveling by taxi.    YOU WILL NOT BE PERMITTED TO DRIVE OR TO LEAVE THE HOSPITAL ALONE AFTER SURGERY.   If you are being discharged the same day, it is strongly recommended that you arrange for someone to remain with you for the first 24 hrs following your surgery.    The Surgeon will speak to your family/visitor after your surgery regarding the outcome of your surgery and post op care.  The Surgeon may speak to you after your surgery, but there is a possibility you may not remember the details.  Please check with your family members regarding the conversation with the Surgeon.    We strongly recommend whoever is bringing you home be present for discharge instructions.  This will ensure a thorough understanding for your post op home care.    If the patient has fever, cough, or signs/symptoms of Flu or Covid please do not come in for your surgery. Contact your surgeon and your primary care physician for further instructions.           Thank you for your cooperation.  The Staff of [facility].            Bathing Instructions with Hibiclens    Shower the evening before and morning of your procedure with Chlorhexidine (Hibiclens)  do not use Chlorhexidine on your face or genitals. Do not get in your eyes.  Wash your face with water and your regular face wash/soap  Use your regular shampoo  Apply Chlorhexidine (Hibiclens) directly on your skin or on a wet washcloth and wash gently. When showering: Move away from the shower stream when applying Chlorhexidine (Hibiclens) to avoid rinsing off too soon.  Rinse thoroughly with warm water  Do not dilute Chlorhexidine (Hibiclens)   Dry off as usual, do not use any deodorant, powder, body lotions, perfume, after shave or cologne.

## 2024-10-15 ENCOUNTER — OFFICE VISIT (OUTPATIENT)
Dept: PLASTIC SURGERY | Facility: CLINIC | Age: 59
End: 2024-10-15
Payer: COMMERCIAL

## 2024-10-15 ENCOUNTER — TELEPHONE (OUTPATIENT)
Dept: PLASTIC SURGERY | Facility: CLINIC | Age: 59
End: 2024-10-15
Payer: COMMERCIAL

## 2024-10-15 VITALS
SYSTOLIC BLOOD PRESSURE: 169 MMHG | BODY MASS INDEX: 25.6 KG/M2 | DIASTOLIC BLOOD PRESSURE: 91 MMHG | HEIGHT: 64 IN | HEART RATE: 76 BPM | WEIGHT: 149.94 LBS

## 2024-10-15 DIAGNOSIS — C50.412 MALIGNANT NEOPLASM OF UPPER-OUTER QUADRANT OF BOTH BREASTS IN FEMALE, ESTROGEN RECEPTOR POSITIVE: Primary | ICD-10-CM

## 2024-10-15 DIAGNOSIS — C50.411 MALIGNANT NEOPLASM OF UPPER-OUTER QUADRANT OF BOTH BREASTS IN FEMALE, ESTROGEN RECEPTOR POSITIVE: Primary | ICD-10-CM

## 2024-10-15 DIAGNOSIS — Z17.0 MALIGNANT NEOPLASM OF UPPER-OUTER QUADRANT OF BOTH BREASTS IN FEMALE, ESTROGEN RECEPTOR POSITIVE: Primary | ICD-10-CM

## 2024-10-15 PROBLEM — Z91.89 AT RISK FOR LYMPHEDEMA: Status: ACTIVE | Noted: 2024-10-15

## 2024-10-15 LAB
OHS QRS DURATION: 84 MS
OHS QTC CALCULATION: 390 MS

## 2024-10-15 PROCEDURE — 3077F SYST BP >= 140 MM HG: CPT | Mod: CPTII,S$GLB,, | Performed by: SURGERY

## 2024-10-15 PROCEDURE — 1159F MED LIST DOCD IN RCRD: CPT | Mod: CPTII,S$GLB,, | Performed by: SURGERY

## 2024-10-15 PROCEDURE — 99204 OFFICE O/P NEW MOD 45 MIN: CPT | Mod: S$GLB,,, | Performed by: SURGERY

## 2024-10-15 PROCEDURE — 99999 PR PBB SHADOW E&M-EST. PATIENT-LVL III: CPT | Mod: PBBFAC,,, | Performed by: SURGERY

## 2024-10-15 PROCEDURE — 3008F BODY MASS INDEX DOCD: CPT | Mod: CPTII,S$GLB,, | Performed by: SURGERY

## 2024-10-15 PROCEDURE — 3080F DIAST BP >= 90 MM HG: CPT | Mod: CPTII,S$GLB,, | Performed by: SURGERY

## 2024-10-15 NOTE — TELEPHONE ENCOUNTER
Contact name: Coco Roger  Relationship: Self  Phone number: [phone]    Reached out to Ms. Sepulveda to confirm their appointment with Dr. Lorenz today, as she was scheduled for 8:30 and we would still like to see her by the end of the day. As there was no answer, I left a voice message to ask that they contact us at their earliest convenience.

## 2024-10-23 ENCOUNTER — PATIENT MESSAGE (OUTPATIENT)
Dept: SURGERY | Facility: CLINIC | Age: 59
End: 2024-10-23
Payer: COMMERCIAL

## 2024-10-29 ENCOUNTER — TELEPHONE (OUTPATIENT)
Dept: SURGERY | Facility: CLINIC | Age: 59
End: 2024-10-29
Payer: COMMERCIAL

## 2024-10-30 ENCOUNTER — HOSPITAL ENCOUNTER (OUTPATIENT)
Dept: RADIOLOGY | Facility: OTHER | Age: 59
Discharge: HOME | DRG: 580 | End: 2024-10-30
Attending: SURGERY
Payer: COMMERCIAL

## 2024-10-30 ENCOUNTER — HOSPITAL ENCOUNTER (INPATIENT)
Facility: OTHER | Age: 59
LOS: 1 days | Discharge: HOME | DRG: 580 | End: 2024-10-31
Attending: SURGERY | Admitting: SURGERY
Payer: COMMERCIAL

## 2024-10-30 ENCOUNTER — ANESTHESIA (OUTPATIENT)
Dept: SURGERY | Facility: OTHER | Age: 59
End: 2024-10-30
Payer: COMMERCIAL

## 2024-10-30 DIAGNOSIS — C50.412 MALIGNANT NEOPLASM OF UPPER-OUTER QUADRANT OF BOTH BREASTS IN FEMALE, ESTROGEN RECEPTOR POSITIVE: ICD-10-CM

## 2024-10-30 DIAGNOSIS — Z17.0 MALIGNANT NEOPLASM OF UPPER-OUTER QUADRANT OF BOTH BREASTS IN FEMALE, ESTROGEN RECEPTOR POSITIVE: ICD-10-CM

## 2024-10-30 DIAGNOSIS — C50.411 MALIGNANT NEOPLASM OF UPPER-OUTER QUADRANT OF BOTH BREASTS IN FEMALE, ESTROGEN RECEPTOR POSITIVE: ICD-10-CM

## 2024-10-30 PROCEDURE — 36000706: Performed by: SURGERY

## 2024-10-30 PROCEDURE — C1819 TISSUE LOCALIZATION-EXCISION: HCPCS | Performed by: SURGERY

## 2024-10-30 PROCEDURE — 63600175 PHARM REV CODE 636 W HCPCS: Mod: JZ,JG | Performed by: SURGERY

## 2024-10-30 PROCEDURE — 71000033 HC RECOVERY, INTIAL HOUR: Performed by: SURGERY

## 2024-10-30 PROCEDURE — 4A1GXSH MONITORING OF SKIN AND BREAST VASCULAR PERFUSION USING INDOCYANINE GREEN DYE, EXTERNAL APPROACH: ICD-10-PCS | Performed by: SURGERY

## 2024-10-30 PROCEDURE — C1713 ANCHOR/SCREW BN/BN,TIS/BN: HCPCS | Performed by: SURGERY

## 2024-10-30 PROCEDURE — 11000001 HC ACUTE MED/SURG PRIVATE ROOM

## 2024-10-30 PROCEDURE — A9520 TC99 TILMANOCEPT DIAG 0.5MCI: HCPCS | Performed by: SURGERY

## 2024-10-30 PROCEDURE — 25000003 PHARM REV CODE 250: Performed by: STUDENT IN AN ORGANIZED HEALTH CARE EDUCATION/TRAINING PROGRAM

## 2024-10-30 PROCEDURE — 0HTV0ZZ RESECTION OF BILATERAL BREAST, OPEN APPROACH: ICD-10-PCS | Performed by: SURGERY

## 2024-10-30 PROCEDURE — 27201423 OPTIME MED/SURG SUP & DEVICES STERILE SUPPLY: Performed by: SURGERY

## 2024-10-30 PROCEDURE — 71000039 HC RECOVERY, EACH ADD'L HOUR: Performed by: SURGERY

## 2024-10-30 PROCEDURE — 88307 TISSUE EXAM BY PATHOLOGIST: CPT | Mod: 59 | Performed by: STUDENT IN AN ORGANIZED HEALTH CARE EDUCATION/TRAINING PROGRAM

## 2024-10-30 PROCEDURE — P9045 ALBUMIN (HUMAN), 5%, 250 ML: HCPCS | Mod: JZ,JG | Performed by: STUDENT IN AN ORGANIZED HEALTH CARE EDUCATION/TRAINING PROGRAM

## 2024-10-30 PROCEDURE — 37000008 HC ANESTHESIA 1ST 15 MINUTES: Performed by: SURGERY

## 2024-10-30 PROCEDURE — 07B60ZX EXCISION OF LEFT AXILLARY LYMPHATIC, OPEN APPROACH, DIAGNOSTIC: ICD-10-PCS | Performed by: SURGERY

## 2024-10-30 PROCEDURE — 07B50ZX EXCISION OF RIGHT AXILLARY LYMPHATIC, OPEN APPROACH, DIAGNOSTIC: ICD-10-PCS | Performed by: SURGERY

## 2024-10-30 PROCEDURE — 88341 IMHCHEM/IMCYTCHM EA ADD ANTB: CPT | Performed by: STUDENT IN AN ORGANIZED HEALTH CARE EDUCATION/TRAINING PROGRAM

## 2024-10-30 PROCEDURE — 63600175 PHARM REV CODE 636 W HCPCS: Performed by: STUDENT IN AN ORGANIZED HEALTH CARE EDUCATION/TRAINING PROGRAM

## 2024-10-30 PROCEDURE — 63600175 PHARM REV CODE 636 W HCPCS: Performed by: SURGERY

## 2024-10-30 PROCEDURE — 25000003 PHARM REV CODE 250: Performed by: SURGERY

## 2024-10-30 PROCEDURE — 36000707: Performed by: SURGERY

## 2024-10-30 PROCEDURE — C1789 PROSTHESIS, BREAST, IMP: HCPCS | Performed by: SURGERY

## 2024-10-30 PROCEDURE — C9290 INJ, BUPIVACAINE LIPOSOME: HCPCS | Performed by: SURGERY

## 2024-10-30 PROCEDURE — C1729 CATH, DRAINAGE: HCPCS | Performed by: SURGERY

## 2024-10-30 PROCEDURE — 37000009 HC ANESTHESIA EA ADD 15 MINS: Performed by: SURGERY

## 2024-10-30 PROCEDURE — 0HHV0NZ INSERTION OF TISSUE EXPANDER INTO BILATERAL BREAST, OPEN APPROACH: ICD-10-PCS | Performed by: SURGERY

## 2024-10-30 PROCEDURE — 88342 IMHCHEM/IMCYTCHM 1ST ANTB: CPT | Performed by: STUDENT IN AN ORGANIZED HEALTH CARE EDUCATION/TRAINING PROGRAM

## 2024-10-30 PROCEDURE — 25000003 PHARM REV CODE 250: Performed by: ANESTHESIOLOGY

## 2024-10-30 DEVICE — CEMENT BONE ANTIBIO SIMPLEX P: Type: IMPLANTABLE DEVICE | Site: BREAST | Status: FUNCTIONAL

## 2024-10-30 DEVICE — GRAFT FLEXHD THIN SM 17.5X20CM: Type: IMPLANTABLE DEVICE | Site: BREAST | Status: FUNCTIONAL

## 2024-10-30 RX ORDER — CEFAZOLIN 2 G/1
2 INJECTION, POWDER, FOR SOLUTION INTRAMUSCULAR; INTRAVENOUS
Status: COMPLETED | OUTPATIENT
Start: 2024-10-30 | End: 2024-10-31

## 2024-10-30 RX ORDER — SUCCINYLCHOLINE CHLORIDE 20 MG/ML
INJECTION INTRAMUSCULAR; INTRAVENOUS
Status: DISCONTINUED | OUTPATIENT
Start: 2024-10-30 | End: 2024-10-30

## 2024-10-30 RX ORDER — VANCOMYCIN HYDROCHLORIDE 1 G/20ML
INJECTION, POWDER, LYOPHILIZED, FOR SOLUTION INTRAVENOUS
Status: DISCONTINUED | OUTPATIENT
Start: 2024-10-30 | End: 2024-10-30 | Stop reason: HOSPADM

## 2024-10-30 RX ORDER — IBUPROFEN 600 MG/1
600 TABLET ORAL 3 TIMES DAILY
Qty: 60 TABLET | Refills: 0 | Status: SHIPPED | OUTPATIENT
Start: 2024-10-30

## 2024-10-30 RX ORDER — SODIUM CHLORIDE 9 MG/ML
INJECTION, SOLUTION INTRAVENOUS CONTINUOUS
Status: DISCONTINUED | OUTPATIENT
Start: 2024-10-30 | End: 2024-10-30

## 2024-10-30 RX ORDER — ACETAMINOPHEN 500 MG
1000 TABLET ORAL EVERY 6 HOURS
Qty: 30 TABLET | Refills: 0 | Status: SHIPPED | OUTPATIENT
Start: 2024-10-30

## 2024-10-30 RX ORDER — ONDANSETRON HYDROCHLORIDE 2 MG/ML
INJECTION, SOLUTION INTRAVENOUS
Status: DISCONTINUED | OUTPATIENT
Start: 2024-10-30 | End: 2024-10-30

## 2024-10-30 RX ORDER — KETAMINE HYDROCHLORIDE 50 MG/ML
INJECTION, SOLUTION INTRAMUSCULAR; INTRAVENOUS
Status: DISCONTINUED | OUTPATIENT
Start: 2024-10-30 | End: 2024-10-30

## 2024-10-30 RX ORDER — PHENYLEPHRINE HYDROCHLORIDE 10 MG/ML
INJECTION INTRAVENOUS
Status: DISCONTINUED | OUTPATIENT
Start: 2024-10-30 | End: 2024-10-30

## 2024-10-30 RX ORDER — IBUPROFEN 600 MG/1
600 TABLET ORAL 3 TIMES DAILY
Status: DISCONTINUED | OUTPATIENT
Start: 2024-10-30 | End: 2024-10-31 | Stop reason: HOSPADM

## 2024-10-30 RX ORDER — MUPIROCIN 20 MG/G
OINTMENT TOPICAL 2 TIMES DAILY
Status: DISCONTINUED | OUTPATIENT
Start: 2024-10-30 | End: 2024-10-31 | Stop reason: HOSPADM

## 2024-10-30 RX ORDER — LIDOCAINE HYDROCHLORIDE 10 MG/ML
0.5 INJECTION, SOLUTION EPIDURAL; INFILTRATION; INTRACAUDAL; PERINEURAL ONCE
Status: DISCONTINUED | OUTPATIENT
Start: 2024-10-30 | End: 2024-10-30 | Stop reason: HOSPADM

## 2024-10-30 RX ORDER — GABAPENTIN 300 MG/1
300 CAPSULE ORAL 3 TIMES DAILY
Qty: 21 CAPSULE | Refills: 0 | Status: SHIPPED | OUTPATIENT
Start: 2024-10-30 | End: 2024-11-07

## 2024-10-30 RX ORDER — METHOCARBAMOL 500 MG/1
500 TABLET, FILM COATED ORAL 4 TIMES DAILY
Qty: 40 TABLET | Refills: 0 | Status: SHIPPED | OUTPATIENT
Start: 2024-10-30 | End: 2024-11-10

## 2024-10-30 RX ORDER — SCOLOPAMINE TRANSDERMAL SYSTEM 1 MG/1
1 PATCH, EXTENDED RELEASE TRANSDERMAL ONCE
Status: COMPLETED | OUTPATIENT
Start: 2024-10-30 | End: 2024-10-30

## 2024-10-30 RX ORDER — GABAPENTIN 300 MG/1
300 CAPSULE ORAL 3 TIMES DAILY
Status: DISCONTINUED | OUTPATIENT
Start: 2024-10-30 | End: 2024-10-31 | Stop reason: HOSPADM

## 2024-10-30 RX ORDER — HYDROMORPHONE HYDROCHLORIDE 2 MG/ML
0.4 INJECTION, SOLUTION INTRAMUSCULAR; INTRAVENOUS; SUBCUTANEOUS EVERY 5 MIN PRN
Status: DISCONTINUED | OUTPATIENT
Start: 2024-10-30 | End: 2024-10-30 | Stop reason: HOSPADM

## 2024-10-30 RX ORDER — CEPHALEXIN 500 MG/1
500 CAPSULE ORAL EVERY 6 HOURS
Qty: 56 CAPSULE | Refills: 0 | Status: SHIPPED | OUTPATIENT
Start: 2024-10-30 | End: 2024-11-14

## 2024-10-30 RX ORDER — PROPOFOL 10 MG/ML
VIAL (ML) INTRAVENOUS CONTINUOUS PRN
Status: DISCONTINUED | OUTPATIENT
Start: 2024-10-30 | End: 2024-10-30

## 2024-10-30 RX ORDER — LIDOCAINE HYDROCHLORIDE 20 MG/ML
INJECTION INTRAVENOUS
Status: DISCONTINUED | OUTPATIENT
Start: 2024-10-30 | End: 2024-10-30

## 2024-10-30 RX ORDER — OXYCODONE HYDROCHLORIDE 5 MG/1
5 TABLET ORAL EVERY 4 HOURS PRN
Status: DISCONTINUED | OUTPATIENT
Start: 2024-10-30 | End: 2024-10-30 | Stop reason: HOSPADM

## 2024-10-30 RX ORDER — DEXAMETHASONE SODIUM PHOSPHATE 4 MG/ML
INJECTION, SOLUTION INTRA-ARTICULAR; INTRALESIONAL; INTRAMUSCULAR; INTRAVENOUS; SOFT TISSUE
Status: DISCONTINUED | OUTPATIENT
Start: 2024-10-30 | End: 2024-10-30

## 2024-10-30 RX ORDER — ACETAMINOPHEN 10 MG/ML
INJECTION, SOLUTION INTRAVENOUS
Status: DISCONTINUED | OUTPATIENT
Start: 2024-10-30 | End: 2024-10-30

## 2024-10-30 RX ORDER — HYDROMORPHONE HYDROCHLORIDE 1 MG/ML
0.5 INJECTION, SOLUTION INTRAMUSCULAR; INTRAVENOUS; SUBCUTANEOUS EVERY 6 HOURS PRN
Status: DISCONTINUED | OUTPATIENT
Start: 2024-10-30 | End: 2024-10-31 | Stop reason: HOSPADM

## 2024-10-30 RX ORDER — ONDANSETRON HYDROCHLORIDE 2 MG/ML
4 INJECTION, SOLUTION INTRAVENOUS EVERY 6 HOURS PRN
Status: DISCONTINUED | OUTPATIENT
Start: 2024-10-30 | End: 2024-10-31 | Stop reason: HOSPADM

## 2024-10-30 RX ORDER — BUPIVACAINE 13.3 MG/ML
INJECTION, SUSPENSION, LIPOSOMAL INFILTRATION
Status: DISCONTINUED | OUTPATIENT
Start: 2024-10-30 | End: 2024-10-30 | Stop reason: HOSPADM

## 2024-10-30 RX ORDER — MIDAZOLAM HYDROCHLORIDE 1 MG/ML
INJECTION INTRAMUSCULAR; INTRAVENOUS
Status: DISCONTINUED | OUTPATIENT
Start: 2024-10-30 | End: 2024-10-30

## 2024-10-30 RX ORDER — MEPERIDINE HYDROCHLORIDE 25 MG/ML
12.5 INJECTION INTRAMUSCULAR; INTRAVENOUS; SUBCUTANEOUS ONCE AS NEEDED
Status: DISCONTINUED | OUTPATIENT
Start: 2024-10-30 | End: 2024-10-30 | Stop reason: HOSPADM

## 2024-10-30 RX ORDER — HYDROCHLOROTHIAZIDE 25 MG/1
25 TABLET ORAL DAILY
Status: DISCONTINUED | OUTPATIENT
Start: 2024-10-30 | End: 2024-10-31 | Stop reason: HOSPADM

## 2024-10-30 RX ORDER — SODIUM CHLORIDE, SODIUM LACTATE, POTASSIUM CHLORIDE, CALCIUM CHLORIDE 600; 310; 30; 20 MG/100ML; MG/100ML; MG/100ML; MG/100ML
INJECTION, SOLUTION INTRAVENOUS CONTINUOUS
Status: DISCONTINUED | OUTPATIENT
Start: 2024-10-30 | End: 2024-10-30

## 2024-10-30 RX ORDER — CEFAZOLIN 2 G/1
2 INJECTION, POWDER, FOR SOLUTION INTRAMUSCULAR; INTRAVENOUS
Status: COMPLETED | OUTPATIENT
Start: 2024-10-30 | End: 2024-10-30

## 2024-10-30 RX ORDER — OXYCODONE HYDROCHLORIDE 5 MG/1
5 TABLET ORAL EVERY 6 HOURS PRN
Status: DISCONTINUED | OUTPATIENT
Start: 2024-10-30 | End: 2024-10-31 | Stop reason: HOSPADM

## 2024-10-30 RX ORDER — GLUCAGON 1 MG
1 KIT INJECTION
Status: DISCONTINUED | OUTPATIENT
Start: 2024-10-30 | End: 2024-10-30 | Stop reason: HOSPADM

## 2024-10-30 RX ORDER — DIPHENHYDRAMINE HYDROCHLORIDE 50 MG/ML
12.5 INJECTION INTRAMUSCULAR; INTRAVENOUS EVERY 30 MIN PRN
Status: DISCONTINUED | OUTPATIENT
Start: 2024-10-30 | End: 2024-10-30 | Stop reason: HOSPADM

## 2024-10-30 RX ORDER — INDOCYANINE GREEN AND WATER 25 MG
KIT INJECTION
Status: DISCONTINUED | OUTPATIENT
Start: 2024-10-30 | End: 2024-10-30

## 2024-10-30 RX ORDER — SODIUM CHLORIDE 9 MG/ML
INJECTION, SOLUTION INTRAVENOUS CONTINUOUS PRN
Status: DISCONTINUED | OUTPATIENT
Start: 2024-10-30 | End: 2024-10-30

## 2024-10-30 RX ORDER — ACETAMINOPHEN 500 MG
1000 TABLET ORAL EVERY 6 HOURS
Status: DISCONTINUED | OUTPATIENT
Start: 2024-10-31 | End: 2024-10-31 | Stop reason: HOSPADM

## 2024-10-30 RX ORDER — PROCHLORPERAZINE EDISYLATE 5 MG/ML
5 INJECTION INTRAMUSCULAR; INTRAVENOUS EVERY 30 MIN PRN
Status: DISCONTINUED | OUTPATIENT
Start: 2024-10-30 | End: 2024-10-30 | Stop reason: HOSPADM

## 2024-10-30 RX ORDER — METHOCARBAMOL 500 MG/1
500 TABLET, FILM COATED ORAL 4 TIMES DAILY
Status: DISCONTINUED | OUTPATIENT
Start: 2024-10-30 | End: 2024-10-31 | Stop reason: HOSPADM

## 2024-10-30 RX ORDER — PROPOFOL 10 MG/ML
VIAL (ML) INTRAVENOUS
Status: DISCONTINUED | OUTPATIENT
Start: 2024-10-30 | End: 2024-10-30

## 2024-10-30 RX ORDER — BUPIVACAINE HYDROCHLORIDE 2.5 MG/ML
INJECTION, SOLUTION EPIDURAL; INFILTRATION; INTRACAUDAL
Status: DISCONTINUED | OUTPATIENT
Start: 2024-10-30 | End: 2024-10-30 | Stop reason: HOSPADM

## 2024-10-30 RX ORDER — FENTANYL CITRATE 50 UG/ML
INJECTION, SOLUTION INTRAMUSCULAR; INTRAVENOUS
Status: DISCONTINUED | OUTPATIENT
Start: 2024-10-30 | End: 2024-10-30

## 2024-10-30 RX ORDER — TRANEXAMIC ACID 100 MG/ML
INJECTION, SOLUTION INTRAVENOUS
Status: DISCONTINUED | OUTPATIENT
Start: 2024-10-30 | End: 2024-10-30 | Stop reason: HOSPADM

## 2024-10-30 RX ORDER — OXYCODONE HYDROCHLORIDE 5 MG/1
5 TABLET ORAL EVERY 4 HOURS PRN
Qty: 10 TABLET | Refills: 0 | Status: SHIPPED | OUTPATIENT
Start: 2024-10-30

## 2024-10-30 RX ORDER — PROCHLORPERAZINE EDISYLATE 5 MG/ML
5 INJECTION INTRAMUSCULAR; INTRAVENOUS EVERY 6 HOURS PRN
Status: DISCONTINUED | OUTPATIENT
Start: 2024-10-30 | End: 2024-10-31 | Stop reason: HOSPADM

## 2024-10-30 RX ORDER — SODIUM CHLORIDE 0.9 % (FLUSH) 0.9 %
3 SYRINGE (ML) INJECTION
Status: DISCONTINUED | OUTPATIENT
Start: 2024-10-30 | End: 2024-10-30 | Stop reason: HOSPADM

## 2024-10-30 RX ORDER — ALBUMIN HUMAN 50 G/1000ML
SOLUTION INTRAVENOUS
Status: DISCONTINUED | OUTPATIENT
Start: 2024-10-30 | End: 2024-10-30

## 2024-10-30 RX ADMIN — KETAMINE HYDROCHLORIDE 30 MG: 50 INJECTION, SOLUTION INTRAMUSCULAR; INTRAVENOUS at 12:10

## 2024-10-30 RX ADMIN — SUCCINYLCHOLINE CHLORIDE 140 MG: 20 INJECTION, SOLUTION INTRAMUSCULAR; INTRAVENOUS at 12:10

## 2024-10-30 RX ADMIN — KETAMINE HYDROCHLORIDE 10 MG: 50 INJECTION, SOLUTION INTRAMUSCULAR; INTRAVENOUS at 01:10

## 2024-10-30 RX ADMIN — INDOCYANINE GREEN AND WATER 10 MG: KIT at 04:10

## 2024-10-30 RX ADMIN — ALBUMIN (HUMAN) 250 ML: 12.5 SOLUTION INTRAVENOUS at 12:10

## 2024-10-30 RX ADMIN — FENTANYL CITRATE 100 MCG: 50 INJECTION, SOLUTION INTRAMUSCULAR; INTRAVENOUS at 12:10

## 2024-10-30 RX ADMIN — PROPOFOL 150 MCG/KG/MIN: 10 INJECTION, EMULSION INTRAVENOUS at 12:10

## 2024-10-30 RX ADMIN — ACETAMINOPHEN 1000 MG: 10 INJECTION INTRAVENOUS at 01:10

## 2024-10-30 RX ADMIN — SCOPOLAMINE 1 PATCH: 1.5 PATCH, EXTENDED RELEASE TRANSDERMAL at 09:10

## 2024-10-30 RX ADMIN — METHOCARBAMOL 500 MG: 500 TABLET ORAL at 09:10

## 2024-10-30 RX ADMIN — PROPOFOL 160 MG: 10 INJECTION, EMULSION INTRAVENOUS at 12:10

## 2024-10-30 RX ADMIN — IBUPROFEN 600 MG: 600 TABLET, FILM COATED ORAL at 09:10

## 2024-10-30 RX ADMIN — KETAMINE HYDROCHLORIDE 10 MG: 50 INJECTION, SOLUTION INTRAMUSCULAR; INTRAVENOUS at 02:10

## 2024-10-30 RX ADMIN — CEFAZOLIN 2 G: 2 INJECTION, POWDER, FOR SOLUTION INTRAMUSCULAR; INTRAVENOUS at 12:10

## 2024-10-30 RX ADMIN — CEFAZOLIN 2 G: 2 INJECTION, POWDER, FOR SOLUTION INTRAMUSCULAR; INTRAVENOUS at 11:10

## 2024-10-30 RX ADMIN — GABAPENTIN 300 MG: 300 CAPSULE ORAL at 09:10

## 2024-10-30 RX ADMIN — MIDAZOLAM HYDROCHLORIDE 2 MG: 1 INJECTION INTRAMUSCULAR; INTRAVENOUS at 11:10

## 2024-10-30 RX ADMIN — LIDOCAINE HYDROCHLORIDE 60 MG: 20 INJECTION, SOLUTION INTRAVENOUS at 12:10

## 2024-10-30 RX ADMIN — TILMANOCEPT 488 MICROCURIE: KIT at 12:10

## 2024-10-30 RX ADMIN — CEFAZOLIN 2 G: 2 INJECTION, POWDER, FOR SOLUTION INTRAMUSCULAR; INTRAVENOUS at 04:10

## 2024-10-30 RX ADMIN — ACETAMINOPHEN 1000 MG: 500 TABLET, FILM COATED ORAL at 11:10

## 2024-10-30 RX ADMIN — TILMANOCEPT 509 MICROCURIE: KIT at 12:10

## 2024-10-30 RX ADMIN — PHENYLEPHRINE HYDROCHLORIDE 100 MCG: 10 INJECTION INTRAVENOUS at 12:10

## 2024-10-30 RX ADMIN — HYDROCHLOROTHIAZIDE 25 MG: 25 TABLET ORAL at 11:10

## 2024-10-30 RX ADMIN — ONDANSETRON HYDROCHLORIDE 4 MG: 2 INJECTION INTRAMUSCULAR; INTRAVENOUS at 04:10

## 2024-10-30 RX ADMIN — SODIUM CHLORIDE: 0.9 INJECTION, SOLUTION INTRAVENOUS at 11:10

## 2024-10-30 RX ADMIN — DEXAMETHASONE SODIUM PHOSPHATE 4 MG: 4 INJECTION, SOLUTION INTRAMUSCULAR; INTRAVENOUS at 12:10

## 2024-10-30 RX ADMIN — MUPIROCIN: 20 OINTMENT TOPICAL at 09:10

## 2024-10-30 RX ADMIN — SODIUM CHLORIDE: 0.9 INJECTION, SOLUTION INTRAVENOUS at 04:10

## 2024-10-30 NOTE — OP NOTE
[facility], [place], operated by [facility] - Surgery ([place])  Plastic Surgery  Operative Note    SUMMARY     Date of Procedure: 10/30/2024     Location:  [facility]    Procedure(s): Procedure(s) (LRB):  MASTECTOMY-Bilateral (Bilateral)  BIOPSY, LYMPH NODE, SENTINEL-Bilateral (Bilateral)  INJECTION, FOR SENTINEL NODE IDENTIFICATION (Bilateral)  INSERTION, TISSUE EXPANDER, BREAST (Bilateral)  INSERTION, BIOLOGIC IMPLANT, FOR SOFT TISSUE REINFORCEMENT / ACELLULAR DERMIS MATRIX (Bilateral)  INJECTION, AGENT, INTRAVENOUS, FOR ASSESSMENT OF BLOOD FLOW IN FLAP OR GRAFT / ( ICG ) / INDOCYANINE GREEN - (Bilateral)     Immediate insertion of bilateral breast tissue expanders   Use of bilateral ADM anterior wraps for soft tissue support   Creation and implantation of drug-eluting antibiotic discs    Surgeons and Role:  Panel 1:     * Ileana Patel MD - Primary  Panel 2:     * Roberto Lorenz DO - Primary    Assistant: NALINI Duran and Andrade Mercado MD, Fellow    Pre-Operative Diagnosis: 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  She was found to have bilateral breast masses and elected to undergo bilateral mastectomy.  She was not a candidate for nipple sparing mastectomy.  We plan to place tissue expanders in her ultimate plan was for implant based breast reconstruction    Operative Findings (including complications, if any):    Right mastectomy weighed 606 g   Left mastectomy weight is 670 g   Bilateral placement of 375 smooth Artoura high-profile tissue expanders   17 x 20 cm FlexHD pre pliable anterior wrap for soft tissue support   Creation and implantation of methylmethacrylate with vancomycin and tobramycin drug-eluting discs    Description of Procedures:  The patient was seen in the preoperative holding area and her breasts were  marked.  Surgical and photographic consents  were signed in preop.  Incisions were discussed preoperatively with Ileana Patel MD and we  decided upon  circumareolar.  She needed a vertical extension on the left side.  The patient was taken to the operating room.  General anesthesia was induced.  The breasts and axilla were prepped and draped the usual sterile fashion.  Please see Ileana Patel MD operative note for their portion of the procedure.    When I entered the operating room the  bilateral skin sparing mastectomy was complete.  Next the skin was re-prepped with chlorhexidine and new drapes were laid down.  The bilateral breast pockets were checked for hemostasis.  By the base width was measured and  12cm was thought to be an appropriate sized implant.     To 375 cc smooth Artoura high-profile tissue expanders and 2 sheets of 17 x 20 FlexHD pre pliable ADM was opened on the back table and allowed to soak antibiotic solution.     Additionally the methylmethacrylate drug-eluting discs were created.  The methylmethacrylate with a g of tobramycin was mixed with 3 g of vancomycin powder.  The activating solution was added.  It was mixed in a mixing bowl.  Two discs were formed both measuring roughly 6 x 0.3 cm.  They were allowed to cure on the back table     20cc of exparel was diluted with 20cc 0.25% marcaine.  Intercostal rib blocks were performed as well as injection of the CHARLINE drain sites and the PEC1 plane under the pec major muscle.  Again the wound was made hemostatic using the bovie.   15 Kazakh faby drain(s) were placed and brought out through the anterior axillary line. The drains were sewn in using a 3-0 nylon suture.   1 below of hemo blasts was used between each breast for additional hemostasis     The ADM was draped over the tissue expander on the back table.  The excess was marked.  It was with the cardinal directions.  It was reflected onto a 2nd piece of ADM after it was wrung out.    Each was  placed in the pocket.  It was oriented appropriately.  A 2-0 PDS suture was used to run the anterior sling of the ADM from the 12 o'clock position down to the lower lateral and lower medial positioned.  Once suture was used on each side.  Additionally 2-0 Vicryl suture were used laterally for pocket control.    Next both pockets were irrigated with dilute Betadine and Vashe solution.  They were checked again for hemostasis.      The tissue expanders were then placed under the ADM.  The lower medial and 6 o'clock position tabs were sewn to the chest wall    The mastectomy utilized a  circumareolar with a vertical extension pattern skin incision. The incision was temporaily stapled close  into a short wise pattern.   Small amount of air air was added to the expander.    ICG angiography was performed using the  spy phi machine. 4cc of reconstituted ICG was injected by anesthesia.  It was initially slow perfusion of the mastectomy skin flaps.  After 90 seconds both flaps were well-perfused.   there was a small corner of the lateral incision on the  left side that it was poorly perfused.  It was marked to be excised.    Next the tailor tacked incision was marked.  The staples were removed.  The lower pole skin was de-epithelialized.  The poory perfused  areola on the left side was removed as it was a small strip of skin along the incision  Both pockets were irrigated with TXA solution    Skin was closed with  buried 3-0 Monocryl and running 3-0 Stratafix    The wound was dressed with  Prineo tape, Mepilex,, ABD pads and a post op bra.  CHARLINE drain sites were dressed with Tegaderm CHG drain dressings.  Patient tolerated the procedure well and was taken to the recovery room in stable condition.      Significant Surgical Tasks Conducted by the Assistant(s), if Applicable: The indicated resident served as first assistant for this procedure.    Estimated Blood Loss (EBL): 20mL           Implants:   Implant Name Type Inv. Item  Serial No.  Lot No. LRB No. Used Action   CEMENT BONE ANTIBIO SIMPLEX P - [id number]  CEMENT BONE ANTIBIO SIMPLEX P  Generaytor. [id number] Bilateral 1 Implanted   [id number]  GRAFT FLEXHD THIN SM 17.5X20CM [id number] MTF [id number] Right 1 Implanted   [id number]  GRAFT FLEXHD THIN SM 17.5X20CM [id number] MTF [id number] Left 1 Implanted   MENTOR Artoura plus smoothbreast tissue expander     [id number] Left 1 Implanted   [place] Artoura plus smooth tissue expander     [id number] Right 1 Implanted       Specimens:   Specimen (24h ago, onward)       Start     Ordered    10/30/24 1610  Specimen to Pathology, Surgery Breast  Once        Comments: Pre-op Diagnosis: 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, estrogen receptor positive [C50.411, Z17.0, C50.412]Procedure(s):MASTECTOMY-BilateralBIOPSY, LYMPH NODE, SENTINEL-BilateralINJECTION, FOR SENTINEL NODE IDENTIFICATIONLYMPHADENECTOMY, AXILLARYINSERTION, TISSUE EXPANDER, BREASTINSERTION, BIOLOGIC IMPLANT, FOR SOFT TISSUE REINFORCEMENT / ACELLULAR DERMIS MATRIXINJECTION, AGENT, INTRAVENOUS, FOR ASSESSMENT OF BLOOD FLOW IN FLAP OR GRAFT / ( ICG ) / INDOCYANINE GREEN - Number of specimens: 8Name of specimens: 1. Right breast short stitch superior long stitch lateral2. Right breast upper outer margin, ink marks the new margin3. Right axillary sentinel lymph node Hot 4854. Right axillary sentinel lymph node Hot 1085. Right axillary sentinel lymph node Hot 976. Right axillary tail tissue, ink marks the new margin7. Left breast short stitch superior long stitch lateral, skin overlies tumor8. 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 Right breast short stitch superior long stitch lateral2. Right breast upper outer margin, ink marks the new margin3. Right axillary sentinel lymph node Hot 4854. Right axillary sentinel lymph node Hot 1085. Right axillary sentinel lymph node Hot 976. Right axillary tail tissue, ink marks the new margin7. Left breast short stitch superior long stitch lateral, skin overlies tumor8. Left axillary sentinel lymph node Hot 625     References:    Click here for ordering Quick Tip   Question Answer Comment   Procedure Type: Breast    Specimen Class: Known or suspected malignancy    Release to patient Immediate        10/30/24 0115                            Condition: Good    Disposition: PACU - hemodynamically stable.,  we will be kept in extended recovery overnight as she lives a ways away and finished surgery in the    Attestation: I was present and scrubbed for the entire procedure.

## 2024-10-30 NOTE — H&P
Breast Surgery  [facility]  Department of Surgery        REFERRING PROVIDER: Self, Marvin  No address on file     Chief Complaint: breast mass        Subjective:      Patient ID: Coco Roger is a 59 y.o. female who presents with bilateral IDC. Left sided ER+, [place] + HER -. R sided ER+ WY+ HER -. She first palpated a mass in July after getting out of the shower and then had abnormal screening mammogram read as Birad 0 on 24.     Follow-up ultrasound (24) showed Three suspicious R breast regions: 2.2 cm spiculated mass deep upper outer right breast highly suspicious for malignancy. 6 mm enhancing nodule 12:00 o'clock right breast posterior depth, highly suspicious for malignancy. Indeterminate 1.4 cm mildly enhancing 3rd lesion within the anterior aspect of the right breast. As well as normal lymph nodes and a left breast lesion: 7 mm solid enhancing lesion upper outer LEFT breast suspicious for malignancy. A ultrasound guided biopsy was performed on 24 with pathology revealing infiltrating ductal carcinoma of the breast.      Patient does routinely do self breast exams.  Patient had noted a change on breast exam.  Patient denies nipple discharge. Patient denies to previous breast biopsy. Patient denies a personal history of breast cancer.     Findings at that time were the followin. LEFT BREAST, MASS AT 5 O'CLOCK 9 CM FROM NIPPLE, BIOPSY:  Invasive ductal carcinoma (no special type), [place] histologic grade 1 (tubules: 3, nuclear:  1, mitoses: 1).  Largest continuous invasive focus measures 4 mm.  Atypical ductal hyperplasia (ADH).    BREAST BIOMARKER RESULTS  Estrogen receptor (ER):  Positive (nearly 100%, strong)  Progesterone receptor ([place]):  Positive (90%, moderate to strong)  HER2 IHC:  Negative (1+)  Ki-67 proliferation index:  25%      2. RIGHT BREAST, MASS AT 1 O'CLOCK 6 CM FROM NIPPLE, BIOPSY:  Invasive ductal carcinoma (no special type), [place] histologic grade  1 (tubules: 2, nuclear:  1, mitoses: 1).  Largest continuous invasive focus measures 5 mm.    BREAST BIOMARKER RESULTS  Estrogen receptor (ER):  Positive (95%, strong)  Progesterone receptor (SC):  Positive (30%, weak to moderate)  HER2 IHC:  Equivocal (2+); reflex to Dual JENNIFER pending  Ki-67 proliferation index:  10%      3. RIGHT BREAST, MASS AT 11 O'CLOCK 5 CM FROM NIPPLE, BIOPSY:  Invasive ductal carcinoma (no special type), [place] histologic grade 1 (tubules: 3, nuclear:  1, mitoses: 1).  Largest continuous invasive focus measures 12 mm.    BREAST BIOMARKER RESULTS  Estrogen receptor (ER):  Positive (nearly 100%, strong)  Progesterone receptor (SC):  Positive (90%, moderate to strong)  HER2 IHC:  Equivocal (2+); reflex to Dual JENNIFER pending  Ki-67 proliferation index:  20%      4. RIGHT BREAST, MASS AT 11 O'CLOCK 1 CM FROM NIPPLE, BIOPSY:  Invasive ductal carcinoma (no special type), [place] histologic grade 1 (tubules: 2, nuclear pleomorphism: 2, mitotic activity:  1)  Largest continuous invasive focus measures 12 mm.  Ductal carcinoma in situ (DCIS), intermediate nuclear grade, cribriform type with comedonecrosis.      Tumor size: 2.4 cm   Tumor ndgndrndanddndend:nd nd2nd Estrogen Receptor: +   Progesterone Receptor: +   Her-2 ran: -   Lymph node status: clinically negative   Lymphatic invasion: clinically negative      GYN History:  Age of menarche was 11. [age] men[age].  Last menstrual period was 6 year ago. Patient admits to hormonal therapy. Patient is . Patient did breast feed.          Past Medical History:   Diagnosis Date    Abnormal Pap smear of cervix age 20    Abnormal Pap smear of vagina       pt was 20 yr of age    Hypertension              Past Surgical History:   Procedure Laterality Date     SECTION        ENDOMETRIAL ABLATION        TUBAL LIGATION          Medications Ordered Prior to Encounter          Current Outpatient Medications on File Prior to Visit   Medication Sig  Dispense Refill    atenolol (TENORMIN) 50 MG tablet 75 mg.    3    citalopram (CELEXA) 10 MG tablet Take 1 tablet (10 mg total) by mouth once daily. 30 tablet 2    diazePAM (VALIUM) 2 MG tablet Take 1 tablet (2 mg total) by mouth every 8 (eight) hours as needed for Anxiety. 10 tablet 0    ezetimibe (ZETIA) 10 mg tablet Take 10 mg by mouth.        hydroCHLOROthiazide (HYDRODIURIL) 25 MG tablet Take 1 tablet (25 mg total) by mouth once daily. 90 tablet 3      No current facility-administered medications on file prior to visit.         Social History            Socioeconomic History    Marital status:    Tobacco Use    Smoking status: Never    Smokeless tobacco: Never   Substance and Sexual Activity    Alcohol use: Never    Drug use: Never    Sexual activity: Yes       Partners: Male       Birth control/protection: Post-menopausal       Comment:       Social Determinants of Health           Financial Resource Strain: Low Risk  (9/18/2024)     Overall Financial Resource Strain (CARDIA)      Difficulty of Paying Living Expenses: Not hard at all   Food Insecurity: No Food Insecurity (9/18/2024)     Hunger Vital Sign      Worried About Running Out of Food in the Last Year: Never true      Ran Out of Food in the Last Year: Never true   Physical Activity: Insufficiently Active (9/18/2024)     Exercise Vital Sign      Days of Exercise per Week: 2 days      Minutes of Exercise per Session: 60 min   Stress: Stress Concern Present (9/18/2024)     [employer] [place] [employer] - Occupational Stress Questionnaire      Feeling of Stress : To some extent   Housing Stability: Unknown (9/18/2024)     Housing Stability Vital Sign      Unable to Pay for Housing in the Last Year: No             Family History   Problem Relation Name Age of Onset    Breast cancer Sister        Colon cancer Neg Hx        Ovarian cancer Neg Hx             Review of Systems   Constitutional:  Negative for appetite change, chills,  "fever and unexpected weight change.   HENT:  Negative for facial swelling, postnasal drip and sore throat.    Eyes:  Negative for redness and itching.   Respiratory:  Negative for chest tightness and shortness of breath.    Cardiovascular:  Negative for chest pain and palpitations.   Gastrointestinal:  Negative for abdominal pain, blood in stool, diarrhea, nausea and vomiting.   Genitourinary:  Negative for difficulty urinating and dysuria.   Musculoskeletal:  Negative for arthralgias, back pain and joint swelling.   Skin:  Positive for color change (bruising). Negative for rash and wound.   Neurological:  Negative for dizziness and syncope.   Hematological:  Negative for adenopathy.   Psychiatric/Behavioral:  Negative for agitation. The patient is not nervous/anxious.         Increased emotions since stopping HRT      Objective:   BP (!) 145/77   Pulse 60   Ht 5' 4" (1.626 m)   Wt 68 kg (150 lb)   LMP 02/05/2016   BMI 25.75 kg/m²      Physical Exam   HENT:   Head: Normocephalic and atraumatic.   Eyes: Conjunctivae are normal. No scleral icterus.   Cardiovascular:  Normal rate, regular rhythm and normal pulses.            Pulmonary/Chest: Effort normal and breath sounds normal. No accessory muscle usage or stridor. No respiratory distress. She has no wheezes. Right breast exhibits mass and skin change (bruising). Right breast exhibits no inverted nipple, no nipple discharge and no tenderness. Left breast exhibits no inverted nipple, no mass, no nipple discharge, no skin change and no tenderness.   Right breast with 3cm mass at the11 oclock position. Diffuse bruising on the right breast. About 1 cm round mobile firm mass in the Right axillary tail, consistent with FA.         Abdominal: Soft. Normal appearance. She exhibits no mass.   Musculoskeletal: No deformity. Lymphadenopathy:      Cervical: No cervical adenopathy.      Upper Body:      Right upper body: No supraclavicular or axillary adenopathy.      Left " upper body: No supraclavicular or axillary adenopathy.      Neurological: She is alert.   Skin: Skin is warm and dry.      Psychiatric: Mood and judgment normal.         Radiology review: Images personally reviewed by me in the clinic.      Mammogram:  Right breast (contrast enhanced mammo and ultrasound):      Solid hypoechoic irregularly lobulated 2.2 x 1.7 cm enhancing mass within posterior depth of the upper outer right breast is highly suspicious for malignancy.      Second 6 mm enhancing solid mass 12 o'clock position posterior depth right breast, also highly suspicious for malignancy.      Within the lateral retroareolar aspect of right breast there is a 3rd lesion.  On ultrasound, the lesion measures approximately 1.4 cm in greatest dimension with acircumscribed lobulated border, mixed echogenicity and through transmission.  This lesion mildly enhances and is indeterminate.      Right axilla ultrasound: A few small sonographically normal appearing lymph nodes detected along with a 1.1 x 1.1 x 0.9 cm lymph node which is not significantly enlarged, though demonstrates cortical thickening and no identifiable fatty hilum, possible pathologic node.      Left breast (contrast enhance mammo and ultrasound):      Broad region of non mass parenchymal enhancement within the superior aspect of the left breast with a subcentimeter nodular focus of relative increased attenuation on the lateral projection.  Ultrasound of the entire superior aspect of the left breast demonstrates a single 7 x 5 mm hypoechoic solid nodule with blood flow within the upper slight lateral left breast most likely corresponding to the subcentimeter nodular focus of enhancement on contrast enhanced mammography.  No other focal lesion detected within the superior aspect of the left breast on ultrasound.      IMPRESSION:   2.2 cm spiculated mass deep upper outer right breast highly suspicious for malignancy     6 mm enhancing nodule 12:00 o'clock  right breast posterior depth, highly suspicious for malignancy      Indeterminate 1.4 cm mildly enhancing 3rd lesion within the anterior aspect of the right breast     Nonenlarged abnormal appearing right axillary lymph node, possible metastatic disease      7 mm solid enhancing lesion upper outer LEFT breast suspicious for malignancy              Three right breast lesions, abnormal right axillary lymph node, single left breast lesion      Birads 5     Ultrasound:     Right breast (contrast enhanced mammo and ultrasound):      Solid hypoechoic irregularly lobulated 2.2 x 1.7 cm enhancing mass within posterior depth of the upper outer right breast is highly suspicious for malignancy.      Second 6 mm enhancing solid mass 12 o'clock position posterior depth right breast, also highly suspicious for malignancy.      Within the lateral retroareolar aspect of right breast there is a 3rd lesion.  On ultrasound, the lesion measures approximately 1.4 cm in greatest dimension with acircumscribed lobulated border, mixed echogenicity and through transmission.  This lesion mildly enhances and is indeterminate.      Right axilla ultrasound: A few small sonographically normal appearing lymph nodes detected along with a 1.1 x 1.1 x 0.9 cm lymph node which is not significantly enlarged, though demonstrates cortical thickening and no identifiable fatty hilum, possible pathologic node.      Left breast (contrast enhance mammo and ultrasound):      Broad region of non mass parenchymal enhancement within the superior aspect of the left breast with a subcentimeter nodular focus of relative increased attenuation on the lateral projection.  Ultrasound of the entire superior aspect of the left breast demonstrates a single 7 x 5 mm hypoechoic solid nodule with blood flow within the upper slight lateral left breast most likely corresponding to the subcentimeter nodular focus of enhancement on contrast enhanced mammography.  No other focal  lesion detected within the superior aspect of the left breast on ultrasound.      IMPRESSION:   2.2 cm spiculated mass deep upper outer right breast highly suspicious for malignancy     6 mm enhancing nodule 12:00 o'clock right breast posterior depth, highly suspicious for malignancy      Indeterminate 1.4 cm mildly enhancing 3rd lesion within the anterior aspect of the right breast     Nonenlarged abnormal appearing right axillary lymph node, possible metastatic disease      7 mm solid enhancing lesion upper outer LEFT breast suspicious for malignancy              Three right breast lesions, abnormal right axillary lymph node, single left breast lesion      Birads 5     MRI:  Left  There is a 0.6 cm x 0.6 cm x 0.5 cm irregularly shaped mass with irregular margins seen in the lower outer quadrant of the left breast in the posterior depth, 11 cm from the nipple. Kinetics initial phase is slow. Delayed phase is plateau.  This is best appreciated on axial postcontrast image 26 of 128.   There is a 0.8 cm homogeneous, non-mass enhancement in a linear distribution seen in the upper inner quadrant of the left breast in the middle depth. Kinetics initial phase is slow. Delayed phase is plateau.  This is best appreciated on axial postcontrast image 70 of 128.      Right  There is a 2.4 cm x 2.2 cm x 2.6 cm irregularly shaped mass with irregular margins and heterogeneous internal enhancement seen in the upper outer quadrant of the right breast in the posterior depth, 6.9 cm from the nipple, 1.2 cm from the skin, and 5.5 cm from the chest wall. The mass correlates with the prior mammogram finding and ultrasound finding. Kinetics initial phase is fast. Delayed phase is washout.   There is a 0.7 cm x 0.6 cm x 0.9 cm irregularly shaped mass with irregular margins and homogeneous internal enhancement seen in the upper inner quadrant of the right breast in the middle depth, 7.5 cm from the nipple. The mass correlates with the  prior mammogram finding and ultrasound finding. Kinetics initial phase is fast. Delayed phase is washout.   There is a 1.1 cm x 0.8 cm x 1.4 cm oval mass with heterogeneous internal enhancement seen in the outer central region of the right breast in the anterior depth. The mass correlates with the prior mammogram finding and ultrasound finding. Kinetics initial phase is fast. Delayed phase is washout.  There is a 4 mm enhancing mass positioned directly anterior and medial, which is also suspicious of malignancy.   There is a 1 cm clumped, non-mass enhancement in a focal distribution seen in the axillary tail region of the right breast.      Impression:  Left Breast:     Mass: Left breast 0.6 cm x 0.6 cm x 0.5 cm mass at the lower outer position and posterior depth. Assessment: 4 - Suspicious finding. Biopsy is recommended.  A correlate was not definitively established on the outside mammographic and ultrasound examinations.  Additional evaluation with ultrasound is recommended.  If a correlate is established, an ultrasound-guided core needle biopsy is recommended.  If a correlate is not established, an MRI guided biopsy is recommended.        Non-mass Enhancement: Left breast 0.8 cm non-mass enhancement at the upper inner position and middle depth. Assessment: 4 - Suspicious finding.  This area can be expectantly managed pending the biopsy results.  If warranted for surgical planning, an MRI guided biopsy is recommended.     Right Breast:  Overall, findings are highly suspicious for multifocal/multicentric right breast malignancy with findings as follows:     Mass: Right breast 2.4 cm x 2.2 cm x 2.6 cm mass at the upper outer position and posterior depth. Assessment: 5 - Highly suggestive of malignancy. Biopsy is recommended.  This mass was appreciated on the prior mammographic and ultrasound examinations.  An ultrasound-guided core needle biopsy is recommended.     Mass: Right breast 0.7 cm x 0.6 cm x 0.9 cm mass  at the upper inner position and middle depth. Assessment: 5 - Highly suggestive of malignancy. Biopsy is recommended. This mass was appreciated on the prior mammographic and ultrasound examinations.  An ultrasound-guided core needle biopsy is recommended.     Mass: Right breast 1.1 cm x 0.8 cm x 1.4 cm mass at the outer central position and anterior depth. Assessment: 5 - Highly suggestive of malignancy. Biopsy is recommended. This mass was appreciated on the prior mammographic and ultrasound examinations.  An ultrasound-guided core needle biopsy is recommended.     Non-mass Enhancement: Right breast 1 cm non-mass enhancement at the axillary tail position. Assessment: 0 - Incomplete. Diagnostic Mammogram and/or Ultrasound is recommended.  The need for biopsy will be determined at that time.     BI-RADS Category:   Overall: 5 - Highly Suggestive of Malignancy  Assessment:      Assessment  1. 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, estrogen receptor positive    2. Pre-op testing          Plan:      Options for management were discussed with the patient and her family. We reviewed the existing data noting the equivalency of breast conserving surgery with radiation therapy and mastectomy. We also reviewed the guidelines of the National Comprehensive Cancer Network for multifocal and bilateral breast carcinoma. We discussed the need for lumpectomy margins to be negative for carcinoma, the necessity for postoperative radiation therapy after breast conservation in most cases, the possibility of a failed or false negative sentinel lymph node biopsy and the potential need for complete lymphadenectomy for a failed or positive sentinel lymph node biopsy were fully discussed. In the setting of mastectomy, delayed or immediate reconstruction options are available and were discussed.      In the setting of lumpectomy, radiation therapy would be recommended majority of the time.  The duration and  treatment side effects were discussed with the patient.  This will coordinated with the radiation oncologist pending final pathology.     We also discussed the role of systemic therapy in the treatment of early stage breast cancer.  We discussed that this is based on tumor biology and sondra status and will be determined based on final pathology.  We discussed that if the cancer is hormone positive, endocrine therapy would be recommended in most cases and its use can reduce the risk of recurrence as well as improve survival. Side effects of treatment were briefly discussed. We also discussed the potential role for chemotherapy based on a number of factors such as tumor phenotype (ER+ vs. triple negative vs. [id number]+) and this would be determined in coordination with the medical oncologist.     The patient, in consultation with her family, has elected to proceed with bilateral total mastectomy and sentinel lymph node biopsy. She is not interested in any form of reconstruction - we discussed options at length.  She has done research and talked with several people at a breast support group about this and feels comfortable without reconstruction.  The operative risks of bleeding, infection, recurrence, scarring, and anesthetic complications and the possibility of requiring further surgery were all noted.     Surgery scheduled. Follow-up in clinic roughly 14 days after surgery.      Patient was educated on breast cancer, receptors, wire localization lumpectomy, mastectomy, sentinel lymph node mapping and biopsy, axillary lymph node dissection, reconstruction, breast prosthesis with post-mastectomy bra and radiation therapy. Patient was given patient information binder [facility].  All her questions were answered.     Total time spent with the patient: 65 minutes.  45 minutes of face to face consultation and 20 minutes of chart review and coordination of care.

## 2024-10-30 NOTE — OP NOTE
Operative Note     10/30/2024    PRE-OP DIAGNOSIS: Malignant neoplasm of lower-outer quadrant of both breasts in female, estrogen receptor positive [C50.411, Z17.0, C50.412]      POST-OP DIAGNOSIS: Post-Op Diagnosis Codes:     * Malignant neoplasm of lower-outer quadrant of both breasts in female, estrogen receptor positive [C50.411, Z17.0, C50.412]    PROCEDURES:    Procedure(s):  MASTECTOMY-Bilateral total  BIOPSY, LYMPH NODE, DEEP SENTINEL-Bilateral  INJECTION, FOR SENTINEL NODE IDENTIFICATION bilateral  SURGEON INTERPRETATION OF SPECIMEN RADIOGRAPH, bilateral    To be dictated separately by Dr. Lorenz:  INSERTION, TISSUE EXPANDER, BREAST  INSERTION, BIOLOGIC IMPLANT, FOR SOFT TISSUE REINFORCEMENT / ACELLULAR DERMIS MATRIX  INJECTION, AGENT, INTRAVENOUS, FOR ASSESSMENT OF BLOOD FLOW IN FLAP OR GRAFT / ( ICG ) / INDOCYANINE GREEN -     SURGEON: Surgeons and Role:  Panel 1:     * Ileana Patel MD - Primary  Panel 2:     * Roberto Lorenz DO - Primary     RESIDENT:   none were available    ANESTHESIA: General     OPERATIVE FINDINGS: Healthy appearing skin flaps at the completion of the mastectomies. Specimen radiographs revealed all cancer clips but the fibroadenoma clip was not located so c-arm was brought in to be sure the area was excised and there was no evidence of additional markers in the film, which was discussed with Dr. Michael in radiology.  [place] nodes were clinically negative bilaterally    INDICATION FOR PROCEDURE: This patient presents with a history of invasive carcinoma of the bilateral breast    PROCEDURE IN DETAIL:  Coco Roger is a 59 y.o. female brought to the operating room for definitive surgery of invasive carcinoma of the bilateral breast.  The patient has elected to undergo bilateral simple mastectomy with sentinel lymph node biopsy for sondra assessment. The patient was informed of the possible risks and complications of the procedure, including but not limited to  anesthetic risks, bleeding, infection, and need for additional surgery.  The patient concurred with the proposed plan, and has given informed consent.  The site of surgery was properly noted/marked in the preoperative holding area.     The patient was then brought to the operating room and placed in the supine position with both upper extremities extended.  local and general anesthesia was administered. Perioperative antibiotics were administered consisting of Ancef and a time out was performed confirming the patient, site, and procedure.  The patient's bilateral breast was injected with technetium to facilitate sentinel lymph node identification. The bilateral chest and axilla was then prepped and draped in the usual sterile fashion.    We then turned our attention to the right breast where a arabella-areolar incision was fashioned to incorporate the nipple areolar complex.  The incision was made with a plasma-blade and extended through the subcutaneous tissues with Bovie electrocautery.  Skin flaps were raised to the clavicle superiorly.  We then  turned our attention to the right axilla.  The gamma probe was used to identify an area of increased radioactivity within the lower axilla. The clavipectoral sheath was sharply incised to reveal the level I axillary lymph nodes. The probe was used to identify a single node with increased radioactivity.  This node was brought into the operative field and carefully dissected free of the surrounding lymphovascular structures.  The highest ex vivo count of the node was 485.  The node was then sent to pathology for  evaluation, labeled as sentinel node #1.  A total of 3 axillary sentinel nodes and 0 axillary non-sentinel nodes were identified, excised and submitted to pathology.  Bed counts were obtained to confirm that the 10% rule had not been violated.   The wound was irrigated with normal saline, and all bleeding points were secured with Bovie electrocautery.     We then  proceeded to raise the remainder of the flaps to the lateral border of the sternum medially, to the inframammary fold inferiorly, and to the anterior border of the latissimus dorsi muscle laterally. The breast tissue was sharply excised off the chest wall taking care to incorporate the pectoralis fascia while leaving the serratus fascia behind.  The resulting mastectomy specimen was marked using a short stitch superiorly and long stitch laterally.  The breast was sent to pathology for permanent evaluation.       We then turned our attention to the left breast where an elliptical incision was fashioned to incorporate the skin overlying tumor and connected to a periareolar incision surrounding the nipple areolar complex.  The incision was made with a plasma-blade and extended through the subcutaneous tissues with Bovie electrocautery.  Skin flaps were raised to the clavicle superiorly.  We then  turned our attention to the left axilla.  The gamma probe was used to identify an area of increased radioactivity within the lower axilla. The clavipectoral sheath was sharply incised to reveal the level I axillary lymph nodes. The probe was used to identify a single node with increased radioactivity.  This node was brought into the operative field and carefully dissected free of the surrounding lymphovascular structures.  The highest ex vivo count of the node was 625.  The node was then sent to pathology for evaluation, labeled as sentinel node #1.  A total of 1 axillary sentinel nodes and 0 axillary non-sentinel nodes were identified, excised and submitted to pathology.  Bed counts were obtained to confirm that the 10% rule had not been violated.   The wound was irrigated with normal saline, and all bleeding points were secured with Bovie electrocautery.     We then proceeded to raise the remainder of the flaps to the lateral border of the sternum medially, to the inframammary fold inferiorly, and to the anterior border of  the latissimus dorsi muscle laterally. The breast tissue was sharply excised off the chest wall taking care to incorporate the pectoralis fascia while leaving the serratus fascia behind.  The resulting mastectomy specimen was marked using a short stitch superiorly and long stitch laterally.  The breast was sent to pathology for permanent evaluation.      Therefore, the operative field was irrigated with normal saline and all bleeding points were secured with Bovie electrocautery.  A 19 Fr faby drain was placed under the mastectomy flap. The incisions were closed by the plastic surgery service following reconstruction.      Dermabond was applied. A post surgical bra was placed on the patient. At the end of the operation, all sponge, instrument, and needle counts x 2 were correct.    ESTIMATED BLOOD LOSS: less than 50 mL    COMPLICATIONS: none    DISPOSITION: PACU - hemodynamically stable.    ATTESTATION:   I performed the procedure.       Bingen Node Biopsy for Breast Cancer - Right  Operation performed with curative intent Yes   Tracer(s) used to identify sentinel nodes in the upfront surgery (non-neoadjuvant) setting Radioactive tracer   Tracer(s) used to identify sentinel nodes in the neoadjuvant setting N/A   All nodes (colored or non-colored) present at the end of a dye-filled lymphatic channel were removed N/A   All significantly radioactive nodes were removed Yes   All palpably suspicious nodes were removed N/A   Biopsy-proven positive nodes marked with clips prior to chemotherapy were identified and removed N/A     Bingen Node Biopsy for Breast Cancer - Left  Operation performed with curative intent Yes   Tracer(s) used to identify sentinel nodes in the upfront surgery (non-neoadjuvant) setting Radioactive tracer   Tracer(s) used to identify sentinel nodes in the neoadjuvant setting N/A   All nodes (colored or non-colored) present at the end of a dye-filled lymphatic channel were removed N/A   All  significantly radioactive nodes were removed Yes   All palpably suspicious nodes were removed N/A   Biopsy-proven positive nodes marked with clips prior to chemotherapy were identified and removed N/A

## 2024-10-30 NOTE — DISCHARGE INSTRUCTIONS
Plastic Surgery Discharge Instructions  Ramon Lorenz DO    Wound Care  1. Shower daily beginning 48 hours after surgery  2. Okay to let warm soapy water run over the wound at that time  3. Do not submerge wounds in the bath  4. Leave any skin glue or mesh tape in place    Drain Care  If you have drains, strip tubing and record output when drain bulb becomes half full, or at least twice daily  Record output for each drain and bring to our follow up appointment    Diet  Regular Diet    Activity  Light activity only - no lifting greater than 10 lbs  Avoid strenuous activity that would cause you to get hot or sweaty    Medications  You have been given a prescription for antibiotics, narcotic pain medication, anti-inflammatory pain, muscle relaxer, and nerve pain  Unless otherwise contraindicated by your doctor, take 2 extra strength Tylenol and 600mg of ibuprofen every 8 hours  Please take medications as prescribed     What to call for:  1. Sustained fever > 101.0  2. Changes in color, sensation, temperature or pain at surgical site  3. Redness and/or drainage from the surgical site  4. Any reaction to prescribed medications  5. Continuous bloody drainage from surgical drains  6. Wound vac malfunction  7. Questions related to the procedure    Follow-up  1. Please call [phone] to schedule or confirm your follow up visit at the [facility], [address]  2. Call with any questions or concerns.  2. After hours call [phone] - ask to speak with the Plastic Surgery fellow on call

## 2024-10-31 VITALS
OXYGEN SATURATION: 97 % | HEIGHT: 64 IN | RESPIRATION RATE: 18 BRPM | TEMPERATURE: 99 F | HEART RATE: 69 BPM | BODY MASS INDEX: 26.54 KG/M2 | SYSTOLIC BLOOD PRESSURE: 134 MMHG | WEIGHT: 155.44 LBS | DIASTOLIC BLOOD PRESSURE: 63 MMHG

## 2024-10-31 PROCEDURE — 25000003 PHARM REV CODE 250: Performed by: STUDENT IN AN ORGANIZED HEALTH CARE EDUCATION/TRAINING PROGRAM

## 2024-10-31 PROCEDURE — 94761 N-INVAS EAR/PLS OXIMETRY MLT: CPT

## 2024-10-31 PROCEDURE — 94799 UNLISTED PULMONARY SVC/PX: CPT

## 2024-10-31 PROCEDURE — 63600175 PHARM REV CODE 636 W HCPCS: Performed by: STUDENT IN AN ORGANIZED HEALTH CARE EDUCATION/TRAINING PROGRAM

## 2024-10-31 RX ORDER — HYDROCHLOROTHIAZIDE 25 MG/1
25 TABLET ORAL DAILY
Qty: 30 TABLET | Refills: 11 | Status: SHIPPED | OUTPATIENT
Start: 2024-10-31 | End: 2025-10-31

## 2024-10-31 RX ADMIN — CEFAZOLIN 2 G: 2 INJECTION, POWDER, FOR SOLUTION INTRAMUSCULAR; INTRAVENOUS at 06:10

## 2024-10-31 RX ADMIN — METHOCARBAMOL 500 MG: 500 TABLET ORAL at 09:10

## 2024-10-31 RX ADMIN — METHOCARBAMOL 500 MG: 500 TABLET ORAL at 12:10

## 2024-10-31 RX ADMIN — IBUPROFEN 600 MG: 600 TABLET, FILM COATED ORAL at 09:10

## 2024-10-31 RX ADMIN — ACETAMINOPHEN 1000 MG: 500 TABLET, FILM COATED ORAL at 06:10

## 2024-10-31 RX ADMIN — GABAPENTIN 300 MG: 300 CAPSULE ORAL at 09:10

## 2024-10-31 RX ADMIN — MUPIROCIN: 20 OINTMENT TOPICAL at 09:10

## 2024-10-31 RX ADMIN — HYDROCHLOROTHIAZIDE 25 MG: 25 TABLET ORAL at 09:10

## 2024-10-31 RX ADMIN — ACETAMINOPHEN 1000 MG: 500 TABLET, FILM COATED ORAL at 12:10

## 2024-10-31 NOTE — PLAN OF CARE
Case Management Final Discharge Note      Discharge Disposition: Home    New DME ordered / company name: NA    Relevant SDOH / Transition of Care Barriers:  None    Primary Caretaker and contact information: Ramesh Roger (Spouse)  [phone]       Scheduled followup appointment: 11/14/2024 2:30 PM     Referrals placed: NA    Transportation: Family     Patient and family educated on discharge services and updated on DC plan. Patient clear to discharge from Case Management Perspective.     [facility] ([address])  Discharge Final Note    Primary Care Provider: Tacho Paul MD    Expected Discharge Date: 10/31/2024    Final Discharge Note (most recent)       Final Note - 10/31/24 0904          Final Note    Assessment Type Final Discharge Note (P)      Anticipated Discharge Disposition Home or Self Care (P)      Hospital Resources/Appts/Education Provided Provided patient/caregiver with written discharge plan information;Appointments scheduled and added to AVS (P)         Post-Acute Status    Post-Acute Authorization Other (P)      Other Status No Post-Acute Service Needs (P)      Discharge Delays None known at this time (P)

## 2024-10-31 NOTE — NURSING
Drain care performed. Bilateral breast CHARLINE drains emptied and line stripped. 40cc emptied from Left drain and 60cc emptied from Right. After drain care , patient's Left breast CHARLINE drain dressing found saturated with jaycob blood. Charge nurse to bedside as well to assess drain site. . Split gauze and pressure tape applied to Left drain site to reinforce surgical dressing. Patient's breasts are soft, pink and no bruising noted. VSS. Dr. Davis notified of assessment findings and interventions. Orders to monitor drain output and dressing, if output significantly increases and reinforced dressing becomes saturated to call back.

## 2024-10-31 NOTE — NURSING
Spoke with Dr. Davis regarding patient's elevated BP and the continuation of her home BP meds. Patient takes Atenolol 75mg nightly and has not had a dose today because of surgery. Dr. Davis to review patient's home medications and orders given to call if SBP is greater than 180.

## 2024-10-31 NOTE — NURSING
Discharged per MD orders.  Instructions given on at home medications, incision care and drain care, activity, and follow-up appointments.  Verbalized understanding of all instructions given.  Awaiting transport for departure.

## 2024-10-31 NOTE — DISCHARGE SUMMARY
[facility] ([address])  Discharge Summary     Patient ID:  Coco Roger  [id number]  59 y.o.  1965    Admit date: 10/30/2024    Discharge Date and Time:  10/31/2024 8:03 AM    Admitting Physician: Ileana Patel MD     Discharge Provider: Andrade Mercado    Reason for Admission: 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, estrogen receptor positive [C50.411, Z17.0, C50.412]    Admission Condition: good    Procedures Performed: Procedure(s) (LRB):  MASTECTOMY-Bilateral (Bilateral)  BIOPSY, LYMPH NODE, SENTINEL-Bilateral (Bilateral)  INJECTION, FOR SENTINEL NODE IDENTIFICATION (Bilateral)  INSERTION, TISSUE EXPANDER, BREAST (Bilateral)  INSERTION, BIOLOGIC IMPLANT, FOR SOFT TISSUE REINFORCEMENT / ACELLULAR DERMIS MATRIX (Bilateral)  INJECTION, AGENT, INTRAVENOUS, FOR ASSESSMENT OF BLOOD FLOW IN FLAP OR GRAFT / ( ICG ) / INDOCYANINE GREEN - (Bilateral)    Hospital Course Patient admitted after bilateral mastectomy with tissue expander placement. She was observed overnight, her post operative course was uncomplicated and she was discharged in stable condition       Final Diagnoses:    Principal Problem: 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, estrogen receptor positive   Secondary Diagnoses:   Active Hospital Problems    Diagnosis  POA    *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, estrogen receptor positive [C50.411, Z17.0, C50.412]  Not Applicable      Resolved Hospital Problems   No resolved problems to display.       Discharged Condition: good    Discharge Exam:  General: Alert; No acute distress  Cardiovascular: Regular rate   Breast: Bilateral tissue expanders are soft under the mastectomy flaps, minimal flap bruising, dressing CDI, left dressing over drain replaced  Respiratory: Normal respiratory effort. Chest rise symmetric.   Abdomen: Soft, nontender, nondistended  Extremity: Moves all extremities  equally.  Neurologic: No focal deficit. Speech normal    Disposition: Final discharge disposition not confirmed    Follow Up/Patient Instructions:     Medications:  Reconciled Home Medications:      Medication List        START taking these medications      acetaminophen 500 MG tablet  Commonly known as: TYLENOL  Take 2 tablets (1,000 mg total) by mouth every 6 (six) hours.     cephALEXin 500 MG capsule  Commonly known as: KEFLEX  Take 1 capsule (500 mg total) by mouth every 6 (six) hours. for 14 days     gabapentin 300 MG capsule  Commonly known as: NEURONTIN  Take 1 capsule (300 mg total) by mouth 3 (three) times daily. for 7 days     ibuprofen 600 MG tablet  Commonly known as: ADVIL,MOTRIN  Take 1 tablet (600 mg total) by mouth 3 (three) times daily.     methocarbamoL 500 MG Tab  Commonly known as: ROBAXIN  Take 1 tablet (500 mg total) by mouth 4 (four) times daily. for 10 days     oxyCODONE 5 MG immediate release tablet  Commonly known as: ROXICODONE  Take 1 tablet (5 mg total) by mouth every 4 (four) hours as needed for Pain.            CHANGE how you take these medications      * hydroCHLOROthiazide 25 MG tablet  Commonly known as: HYDRODIURIL  Take 1 tablet (25 mg total) by mouth once daily.  What changed: Another medication with the same name was added. Make sure you understand how and when to take each.     * hydroCHLOROthiazide 25 MG tablet  Commonly known as: HYDRODIURIL  Take 1 tablet (25 mg total) by mouth once daily.  What changed: You were already taking a medication with the same name, and this prescription was added. Make sure you understand how and when to take each.           * This list has 2 medication(s) that are the same as other medications prescribed for you. Read the directions carefully, and ask your doctor or other care provider to review them with you.                CONTINUE taking these medications      atenoloL 50 MG tablet  Commonly known as: TENORMIN  75 mg.     ezetimibe 10 mg  tablet  Commonly known as: ZETIA  Take 10 mg by mouth.            ASK your doctor about these medications      citalopram 10 MG tablet  Commonly known as: CeleXA  Take 1 tablet (10 mg total) by mouth once daily.     diazePAM 2 MG tablet  Commonly known as: VALIUM  Take 1 tablet (2 mg total) by mouth every 8 (eight) hours as needed for Anxiety.            No discharge procedures on file.    Activity: no lifting, Driving, or Strenuous exercise for 6 weeks   Diet: regular diet  Wound Care: keep wound clean and dry    Follow-up with Dr. Lorenz in 1 week.    Signed:  Andrade Mercado  10/31/2024  8:03 AM

## 2024-11-04 ENCOUNTER — PATIENT MESSAGE (OUTPATIENT)
Dept: PLASTIC SURGERY | Facility: CLINIC | Age: 59
End: 2024-11-04
Payer: COMMERCIAL

## 2024-11-05 ENCOUNTER — LAB VISIT (OUTPATIENT)
Dept: LAB | Facility: HOSPITAL | Age: 59
End: 2024-11-05
Attending: INTERNAL MEDICINE
Payer: COMMERCIAL

## 2024-11-05 ENCOUNTER — OFFICE VISIT (OUTPATIENT)
Dept: PLASTIC SURGERY | Facility: CLINIC | Age: 59
End: 2024-11-05
Payer: COMMERCIAL

## 2024-11-05 VITALS
DIASTOLIC BLOOD PRESSURE: 78 MMHG | SYSTOLIC BLOOD PRESSURE: 144 MMHG | BODY MASS INDEX: 26.87 KG/M2 | WEIGHT: 156.5 LBS | HEART RATE: 78 BPM

## 2024-11-05 DIAGNOSIS — Z09 SURGERY FOLLOW-UP: Primary | ICD-10-CM

## 2024-11-05 DIAGNOSIS — C91.10 CLL (CHRONIC LYMPHOCYTIC LEUKEMIA): ICD-10-CM

## 2024-11-05 LAB
ALBUMIN SERPL BCP-MCNC: 3.1 G/DL (ref 3.5–5.2)
ALP SERPL-CCNC: 65 U/L (ref 40–150)
ALT SERPL W/O P-5'-P-CCNC: 89 U/L (ref 10–44)
ANION GAP SERPL CALC-SCNC: 9 MMOL/L (ref 8–16)
AST SERPL-CCNC: 101 U/L (ref 10–40)
B2 MICROGLOB SERPL-MCNC: 2.8 UG/ML (ref 0–2.5)
BASOPHILS NFR BLD: 0 % (ref 0–1.9)
BILIRUB SERPL-MCNC: 0.3 MG/DL (ref 0.1–1)
BUN SERPL-MCNC: 22 MG/DL (ref 6–20)
CALCIUM SERPL-MCNC: 9.1 MG/DL (ref 8.7–10.5)
CHLORIDE SERPL-SCNC: 107 MMOL/L (ref 95–110)
CO2 SERPL-SCNC: 26 MMOL/L (ref 23–29)
CREAT SERPL-MCNC: 0.9 MG/DL (ref 0.5–1.4)
DIFFERENTIAL METHOD BLD: ABNORMAL
EOSINOPHIL NFR BLD: 0 % (ref 0–8)
ERYTHROCYTE [DISTWIDTH] IN BLOOD BY AUTOMATED COUNT: 13.4 % (ref 11.5–14.5)
EST. GFR  (NO RACE VARIABLE): >60 ML/MIN/1.73 M^2
GLUCOSE SERPL-MCNC: 95 MG/DL (ref 70–110)
HCT VFR BLD AUTO: 38.4 % (ref 37–48.5)
HGB BLD-MCNC: 12.5 G/DL (ref 12–16)
IGA SERPL-MCNC: 257 MG/DL (ref 40–350)
IGG SERPL-MCNC: 877 MG/DL (ref 650–1600)
IGM SERPL-MCNC: 41 MG/DL (ref 50–300)
IMM GRANULOCYTES # BLD AUTO: ABNORMAL K/UL (ref 0–0.04)
IMM GRANULOCYTES NFR BLD AUTO: ABNORMAL % (ref 0–0.5)
LDH SERPL L TO P-CCNC: 166 U/L (ref 110–260)
LYMPHOCYTES NFR BLD: 76 % (ref 18–48)
MCH RBC QN AUTO: 31 PG (ref 27–31)
MCHC RBC AUTO-ENTMCNC: 32.6 G/DL (ref 32–36)
MCV RBC AUTO: 95 FL (ref 82–98)
MONOCYTES NFR BLD: 3 % (ref 4–15)
NEUTROPHILS NFR BLD: 21 % (ref 38–73)
NRBC BLD-RTO: 0 /100 WBC
PLATELET # BLD AUTO: 263 K/UL (ref 150–450)
PLATELET BLD QL SMEAR: ABNORMAL
PMV BLD AUTO: 10.5 FL (ref 9.2–12.9)
POTASSIUM SERPL-SCNC: 4.3 MMOL/L (ref 3.5–5.1)
PROT SERPL-MCNC: 6.5 G/DL (ref 6–8.4)
RBC # BLD AUTO: 4.03 M/UL (ref 4–5.4)
SODIUM SERPL-SCNC: 142 MMOL/L (ref 136–145)
URATE SERPL-MCNC: 4 MG/DL (ref 2.4–5.7)
WBC # BLD AUTO: 14.45 K/UL (ref 3.9–12.7)

## 2024-11-05 PROCEDURE — 99024 POSTOP FOLLOW-UP VISIT: CPT | Mod: S$GLB,,, | Performed by: SURGERY

## 2024-11-05 PROCEDURE — 3078F DIAST BP <80 MM HG: CPT | Mod: CPTII,S$GLB,, | Performed by: SURGERY

## 2024-11-05 PROCEDURE — 80053 COMPREHEN METABOLIC PANEL: CPT | Performed by: INTERNAL MEDICINE

## 2024-11-05 PROCEDURE — 83615 LACTATE (LD) (LDH) ENZYME: CPT | Performed by: INTERNAL MEDICINE

## 2024-11-05 PROCEDURE — 36415 COLL VENOUS BLD VENIPUNCTURE: CPT | Performed by: INTERNAL MEDICINE

## 2024-11-05 PROCEDURE — 99999 PR PBB SHADOW E&M-EST. PATIENT-LVL III: CPT | Mod: PBBFAC,,, | Performed by: SURGERY

## 2024-11-05 PROCEDURE — 82784 ASSAY IGA/IGD/IGG/IGM EACH: CPT | Mod: 59 | Performed by: INTERNAL MEDICINE

## 2024-11-05 PROCEDURE — 85027 COMPLETE CBC AUTOMATED: CPT | Performed by: INTERNAL MEDICINE

## 2024-11-05 PROCEDURE — 85007 BL SMEAR W/DIFF WBC COUNT: CPT | Performed by: INTERNAL MEDICINE

## 2024-11-05 PROCEDURE — 84550 ASSAY OF BLOOD/URIC ACID: CPT | Performed by: INTERNAL MEDICINE

## 2024-11-05 PROCEDURE — 3077F SYST BP >= 140 MM HG: CPT | Mod: CPTII,S$GLB,, | Performed by: SURGERY

## 2024-11-05 PROCEDURE — 82232 ASSAY OF BETA-2 PROTEIN: CPT | Performed by: INTERNAL MEDICINE

## 2024-11-06 ENCOUNTER — PATIENT MESSAGE (OUTPATIENT)
Dept: PLASTIC SURGERY | Facility: CLINIC | Age: 59
End: 2024-11-06
Payer: COMMERCIAL

## 2024-11-06 LAB
FINAL PATHOLOGIC DIAGNOSIS: NORMAL
GROSS: NORMAL
Lab: NORMAL

## 2024-11-10 NOTE — PROGRESS NOTES
Coco Roger presents to Plastic Surgery Clinic for a follow up visit status post bilateral mastectomy with tissue expander placement on 10/30/24. She has 375cc Artoura tissue expanders.     PHYSICAL EXAMINATION  Bilateral breast incision(s) well approximated with no issues.   Bilateral tissue expanders in place   Nipple(s) is/are surgically absent   Drains are in place with moderate output       The fill ports on the right and left tissue expander was marked using the magnet and a pen.  The skin was then prepped using chloroprep.  Using sterile technique the right port was accessed with a 21G butterfly needle. Air was added to the expander.  The skin was checked for tension and cap refill.  The same was done on the contralateral breast    Air added          ASSESSMENT/PLAN  Coco Roger is s/p bilateral SSM with Codey  - Reviewed process of expansion, normal intervals, and performed first air fill.  - CHARLINE drain(s) remains in place, continue empiric antibiotics  - Oncologist ordered labs, she will go downstairs to the lab to have them drawn today  - Follow up 1 weeks for drain evaluation and expansion      All questions were answered. The patient was advised to contact the clinic with any questions or concerns prior to their next visit.       Alexnadra Maurer PA-C  Plastic and Reconstructive Surgery

## 2024-11-12 ENCOUNTER — OFFICE VISIT (OUTPATIENT)
Dept: PLASTIC SURGERY | Facility: CLINIC | Age: 59
End: 2024-11-12
Payer: COMMERCIAL

## 2024-11-12 VITALS
HEART RATE: 69 BPM | SYSTOLIC BLOOD PRESSURE: 162 MMHG | BODY MASS INDEX: 26.94 KG/M2 | WEIGHT: 156.94 LBS | DIASTOLIC BLOOD PRESSURE: 88 MMHG

## 2024-11-12 DIAGNOSIS — Z09 SURGERY FOLLOW-UP: Primary | ICD-10-CM

## 2024-11-12 PROCEDURE — 3079F DIAST BP 80-89 MM HG: CPT | Mod: CPTII,S$GLB,, | Performed by: SURGERY

## 2024-11-12 PROCEDURE — 3077F SYST BP >= 140 MM HG: CPT | Mod: CPTII,S$GLB,, | Performed by: SURGERY

## 2024-11-12 PROCEDURE — 99024 POSTOP FOLLOW-UP VISIT: CPT | Mod: S$GLB,,, | Performed by: SURGERY

## 2024-11-12 PROCEDURE — 99999 PR PBB SHADOW E&M-EST. PATIENT-LVL III: CPT | Mod: PBBFAC,,, | Performed by: SURGERY

## 2024-11-12 RX ORDER — CEPHALEXIN 500 MG/1
500 CAPSULE ORAL EVERY 12 HOURS
Qty: 28 CAPSULE | Refills: 0 | Status: SHIPPED | OUTPATIENT
Start: 2024-11-12 | End: 2024-11-26

## 2024-11-19 ENCOUNTER — OFFICE VISIT (OUTPATIENT)
Dept: PLASTIC SURGERY | Facility: CLINIC | Age: 59
End: 2024-11-19
Payer: COMMERCIAL

## 2024-11-19 VITALS
DIASTOLIC BLOOD PRESSURE: 86 MMHG | BODY MASS INDEX: 26.07 KG/M2 | SYSTOLIC BLOOD PRESSURE: 149 MMHG | WEIGHT: 151.88 LBS | HEART RATE: 61 BPM

## 2024-11-19 DIAGNOSIS — Z09 SURGERY FOLLOW-UP: Primary | ICD-10-CM

## 2024-11-19 PROCEDURE — 3079F DIAST BP 80-89 MM HG: CPT | Mod: CPTII,S$GLB,, | Performed by: SURGERY

## 2024-11-19 PROCEDURE — 99999 PR PBB SHADOW E&M-EST. PATIENT-LVL III: CPT | Mod: PBBFAC,,, | Performed by: SURGERY

## 2024-11-19 PROCEDURE — 3077F SYST BP >= 140 MM HG: CPT | Mod: CPTII,S$GLB,, | Performed by: SURGERY

## 2024-11-19 PROCEDURE — 99024 POSTOP FOLLOW-UP VISIT: CPT | Mod: S$GLB,,, | Performed by: SURGERY

## 2024-11-19 NOTE — PROGRESS NOTES
Coco Roger presents to Plastic Surgery Clinic for a follow up visit status post bilateral mastectomy with tissue expander placement on 10/30/24. She has 375cc Artoura tissue expanders.     PHYSICAL EXAMINATION  Bilateral wise pattern incisions well approximated with no issues.   Bilateral tissue expanders in place   Nipple(s) is/are surgically absent   Drains are in place with moderate output       The fill ports on the right and left tissue expander was marked using the magnet and a pen.  The skin was then prepped using chloroprep.  Using sterile technique the right port was accessed with a 21G butterfly needle. Air was removed and Sterile injectable saline was added to the expander.  The skin was checked for tension and cap refill.  The same was done on the contralateral breast    Right volume added: 240 cc                 Left volume added: 240 cc  Right total volume: 240 cc                    Left total volume: 240 cc          ASSESSMENT/PLAN  Coco Roger is s/p bilateral SSM with expanders  - Expanded today, patient tolerated well  - CHARLINE drains still in place, continue drain care and refill abx  - Follow up 1 week      All questions were answered. The patient was advised to contact the clinic with any questions or concerns prior to their next visit.       Alexandra Maurer PA-C  Plastic and Reconstructive Surgery

## 2024-11-20 NOTE — PROGRESS NOTES
Coco Roger presents to Plastic Surgery Clinic for a follow up visit status post bilateral mastectomy with tissue expander placement on 10/30/24. She has 375cc Artoura tissue expanders.      PHYSICAL EXAMINATION  Bilateral wise pattern incisions well approximated with no issues.   Bilateral tissue expanders in place   Nipple(s) is/are surgically absent   Drains are in place with low output         The fill ports on the right and left tissue expander was marked using the magnet and a pen.  The skin was then prepped using chloroprep.  Using sterile technique the right port was accessed with a 21G butterfly needle. Air was removed and Sterile injectable saline was added to the expander.  The skin was checked for tension and cap refill.  The same was done on the contralateral breast     Right volume added: 120 cc                 Left volume added: 120 cc  Right total volume: 380 cc                    Left total volume: 380 cc            ASSESSMENT/PLAN  Coco Roger is s/p bilateral SSM with expanders  - Expanded today, patient tolerated well  - Advised to keep incisions moisturized with a light application of bacitracin or aquaphor  - CHARLINE drains removed, counseled on seroma precautions  - Follow up 2 week        All questions were answered. The patient was advised to contact the clinic with any questions or concerns prior to their next visit.         Alexandra Maurer PA-C  Plastic and Reconstructive Surgery

## 2024-11-26 ENCOUNTER — DOCUMENTATION ONLY (OUTPATIENT)
Dept: SURGERY | Facility: CLINIC | Age: 59
End: 2024-11-26
Payer: COMMERCIAL

## 2024-11-26 ENCOUNTER — TELEPHONE (OUTPATIENT)
Dept: HEMATOLOGY/ONCOLOGY | Facility: CLINIC | Age: 59
End: 2024-11-26
Payer: COMMERCIAL

## 2024-11-26 DIAGNOSIS — C50.411 MALIGNANT NEOPLASM OF UPPER-OUTER QUADRANT OF BOTH BREASTS IN FEMALE, ESTROGEN RECEPTOR POSITIVE: Primary | ICD-10-CM

## 2024-11-26 DIAGNOSIS — Z17.0 MALIGNANT NEOPLASM OF UPPER-OUTER QUADRANT OF BOTH BREASTS IN FEMALE, ESTROGEN RECEPTOR POSITIVE: Primary | ICD-10-CM

## 2024-11-26 DIAGNOSIS — C50.412 MALIGNANT NEOPLASM OF UPPER-OUTER QUADRANT OF BOTH BREASTS IN FEMALE, ESTROGEN RECEPTOR POSITIVE: Primary | ICD-10-CM

## 2024-11-26 NOTE — PROGRESS NOTES
Message received that patient would like referral to [facility] in [place] for rad onc. Referral faxed.    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 Patel)  First Available Date vs. Scheduled Date (days): 1     Treatment  Current Status: Active    Surgery: Completed  Surgical Oncologist: Dr. Patel  Plastic Surgeon: Dr. Lorenz  Type of Surgery: B mastectomy with SLNB and recon  Consult Date: 09/24/24  Surgery Schedule Date: 10/30/24    Medical Oncologist: Dr. Shaffer  Consult Date: 10/01/24    Radiation Oncologist: Dr. Ibrahim ([place])    Procedures: Genetic test  Biopsy Schedule Date: 09/12/24  Genetic Testing Date Sent: 09/24/24    General Referrals: Support Group; Physical Therapy; Integrative Medicine  Physical Therapy Referral Date: 09/24/24    ER: Positive (Left & Right)  ME: Positive (Left & Right)  Her2: Negative (Left & R pending)    Radiation Oncologist: Dr. Ibrahim ([place])    Support Systems: Spouse/significant other; Family members     [employer]      Follow Up  Follow up in 3 days (on 11/29/2024) for Rad Onc Referral.

## 2024-11-26 NOTE — NURSING
Received message from [facility] breast navigators, that pt would like to see medonc & radonc on the [facility].  I spoke with pt.  I explained who I was and why I was calling.  She is already established with Dr. Shaffer at [facility].  She would like to stay with her.  She was told today that she does not need to see radonc, but she said if she did, she would stay at [facility].  I gave her my contact information & asked her to call if something changes in the future.  She thanked me and verbalized understanding.

## 2024-12-06 ENCOUNTER — OFFICE VISIT (OUTPATIENT)
Dept: PLASTIC SURGERY | Facility: CLINIC | Age: 59
End: 2024-12-06
Payer: COMMERCIAL

## 2024-12-06 VITALS
HEIGHT: 64 IN | SYSTOLIC BLOOD PRESSURE: 145 MMHG | DIASTOLIC BLOOD PRESSURE: 82 MMHG | HEART RATE: 65 BPM | BODY MASS INDEX: 25.93 KG/M2 | WEIGHT: 151.88 LBS

## 2024-12-06 DIAGNOSIS — Z09 SURGERY FOLLOW-UP: Primary | ICD-10-CM

## 2024-12-06 PROCEDURE — 3079F DIAST BP 80-89 MM HG: CPT | Mod: CPTII,S$GLB,, | Performed by: PHYSICIAN ASSISTANT

## 2024-12-06 PROCEDURE — 99999 PR PBB SHADOW E&M-EST. PATIENT-LVL III: CPT | Mod: PBBFAC,,, | Performed by: PHYSICIAN ASSISTANT

## 2024-12-06 PROCEDURE — 1159F MED LIST DOCD IN RCRD: CPT | Mod: CPTII,S$GLB,, | Performed by: PHYSICIAN ASSISTANT

## 2024-12-06 PROCEDURE — 99024 POSTOP FOLLOW-UP VISIT: CPT | Mod: S$GLB,,, | Performed by: PHYSICIAN ASSISTANT

## 2024-12-06 PROCEDURE — 3077F SYST BP >= 140 MM HG: CPT | Mod: CPTII,S$GLB,, | Performed by: PHYSICIAN ASSISTANT

## 2024-12-09 NOTE — PROGRESS NOTES
Coco Roger presents to Plastic Surgery Clinic for a follow up visit status post bilateral mastectomy with tissue expander placement on 10/30/24. She has 375cc Artoura tissue expanders.      PHYSICAL EXAMINATION  Bilateral wise pattern incisions well healed with no issues.   Bilateral tissue expanders in place   Nipples are surgically absent        ASSESSMENT/PLAN  Coco Roger is s/p bilateral SSM with expanders  - Fully expanded and happy with size  - Will start looking for a second stage date  - Continue scar care  - Follow up for preop visit once surgery is booked        All questions were answered. The patient was advised to contact the clinic with any questions or concerns prior to their next visit.         Alexandra Maurer PA-C  Plastic and Reconstructive Surgery

## 2024-12-13 ENCOUNTER — PATIENT MESSAGE (OUTPATIENT)
Dept: PLASTIC SURGERY | Facility: CLINIC | Age: 59
End: 2024-12-13
Payer: COMMERCIAL

## 2024-12-18 RX ORDER — METHOCARBAMOL 500 MG/1
500 TABLET, FILM COATED ORAL EVERY 8 HOURS PRN
Qty: 20 TABLET | Refills: 0 | Status: SHIPPED | OUTPATIENT
Start: 2024-12-18 | End: 2024-12-25

## 2024-12-23 ENCOUNTER — TELEPHONE (OUTPATIENT)
Dept: PLASTIC SURGERY | Facility: CLINIC | Age: 59
End: 2024-12-23
Payer: COMMERCIAL

## 2024-12-23 DIAGNOSIS — Z17.0 MALIGNANT NEOPLASM OF UPPER-OUTER QUADRANT OF BOTH BREASTS IN FEMALE, ESTROGEN RECEPTOR POSITIVE: Primary | ICD-10-CM

## 2024-12-23 DIAGNOSIS — C50.412 MALIGNANT NEOPLASM OF UPPER-OUTER QUADRANT OF BOTH BREASTS IN FEMALE, ESTROGEN RECEPTOR POSITIVE: Primary | ICD-10-CM

## 2024-12-23 DIAGNOSIS — C50.411 MALIGNANT NEOPLASM OF UPPER-OUTER QUADRANT OF BOTH BREASTS IN FEMALE, ESTROGEN RECEPTOR POSITIVE: Primary | ICD-10-CM

## 2024-12-23 NOTE — TELEPHONE ENCOUNTER
Spoke to pt and confirmed  a surgery date 2/14/25 at the  [facility], [facility] floor surgery center- Pt agreeable. Provided patient with details of pre /post op appts, basic prep for sx, arrival time the day before, triage call from anesthesia dept, and address of the location.  call back # to RN navigator given should any questions or concerns arise  All questions and concerns addressed. Pt voiced understanding.  
Alert and oriented to person, place and time

## 2025-01-03 RX ORDER — METHOCARBAMOL 500 MG/1
500 TABLET, FILM COATED ORAL EVERY 8 HOURS PRN
Qty: 20 TABLET | Refills: 0 | Status: SHIPPED | OUTPATIENT
Start: 2025-01-03 | End: 2025-01-10

## 2025-01-31 DIAGNOSIS — Z01.818 PRE-OP TESTING: Primary | ICD-10-CM

## 2025-02-04 ENCOUNTER — OFFICE VISIT (OUTPATIENT)
Dept: PLASTIC SURGERY | Facility: CLINIC | Age: 60
End: 2025-02-04
Payer: COMMERCIAL

## 2025-02-04 ENCOUNTER — LAB VISIT (OUTPATIENT)
Dept: LAB | Facility: HOSPITAL | Age: 60
End: 2025-02-04
Attending: ANESTHESIOLOGY
Payer: COMMERCIAL

## 2025-02-04 DIAGNOSIS — C50.411 MALIGNANT NEOPLASM OF UPPER-OUTER QUADRANT OF BOTH BREASTS IN FEMALE, ESTROGEN RECEPTOR POSITIVE: Primary | ICD-10-CM

## 2025-02-04 DIAGNOSIS — Z01.818 PRE-OP TESTING: ICD-10-CM

## 2025-02-04 DIAGNOSIS — C50.412 MALIGNANT NEOPLASM OF UPPER-OUTER QUADRANT OF BOTH BREASTS IN FEMALE, ESTROGEN RECEPTOR POSITIVE: Primary | ICD-10-CM

## 2025-02-04 DIAGNOSIS — Z17.0 MALIGNANT NEOPLASM OF UPPER-OUTER QUADRANT OF BOTH BREASTS IN FEMALE, ESTROGEN RECEPTOR POSITIVE: Primary | ICD-10-CM

## 2025-02-04 DIAGNOSIS — C91.10 CLL (CHRONIC LYMPHOCYTIC LEUKEMIA): ICD-10-CM

## 2025-02-04 DIAGNOSIS — Z01.818 PRE-OP TESTING: Primary | ICD-10-CM

## 2025-02-04 LAB
ALBUMIN SERPL BCP-MCNC: 3.9 G/DL (ref 3.5–5.2)
ALBUMIN SERPL BCP-MCNC: 3.9 G/DL (ref 3.5–5.2)
ALP SERPL-CCNC: 55 U/L (ref 40–150)
ALP SERPL-CCNC: 56 U/L (ref 40–150)
ALT SERPL W/O P-5'-P-CCNC: 14 U/L (ref 10–44)
ALT SERPL W/O P-5'-P-CCNC: 17 U/L (ref 10–44)
ANION GAP SERPL CALC-SCNC: 12 MMOL/L (ref 8–16)
ANION GAP SERPL CALC-SCNC: 8 MMOL/L (ref 8–16)
AST SERPL-CCNC: 21 U/L (ref 10–40)
AST SERPL-CCNC: 22 U/L (ref 10–40)
BASOPHILS # BLD AUTO: 0.06 K/UL (ref 0–0.2)
BASOPHILS NFR BLD: 0 % (ref 0–1.9)
BASOPHILS NFR BLD: 0.4 % (ref 0–1.9)
BILIRUB SERPL-MCNC: 0.6 MG/DL (ref 0.1–1)
BILIRUB SERPL-MCNC: 0.6 MG/DL (ref 0.1–1)
BUN SERPL-MCNC: 19 MG/DL (ref 6–20)
BUN SERPL-MCNC: 20 MG/DL (ref 6–20)
CALCIUM SERPL-MCNC: 9.4 MG/DL (ref 8.7–10.5)
CALCIUM SERPL-MCNC: 9.4 MG/DL (ref 8.7–10.5)
CHLORIDE SERPL-SCNC: 104 MMOL/L (ref 95–110)
CHLORIDE SERPL-SCNC: 105 MMOL/L (ref 95–110)
CO2 SERPL-SCNC: 26 MMOL/L (ref 23–29)
CO2 SERPL-SCNC: 27 MMOL/L (ref 23–29)
CREAT SERPL-MCNC: 0.9 MG/DL (ref 0.5–1.4)
CREAT SERPL-MCNC: 0.9 MG/DL (ref 0.5–1.4)
DIFFERENTIAL METHOD BLD: ABNORMAL
DIFFERENTIAL METHOD BLD: ABNORMAL
EOSINOPHIL # BLD AUTO: 0.1 K/UL (ref 0–0.5)
EOSINOPHIL NFR BLD: 0.6 % (ref 0–8)
EOSINOPHIL NFR BLD: 2 % (ref 0–8)
ERYTHROCYTE [DISTWIDTH] IN BLOOD BY AUTOMATED COUNT: 12.8 % (ref 11.5–14.5)
ERYTHROCYTE [DISTWIDTH] IN BLOOD BY AUTOMATED COUNT: 12.8 % (ref 11.5–14.5)
EST. GFR  (NO RACE VARIABLE): >60 ML/MIN/1.73 M^2
EST. GFR  (NO RACE VARIABLE): >60 ML/MIN/1.73 M^2
GLUCOSE SERPL-MCNC: 91 MG/DL (ref 70–110)
GLUCOSE SERPL-MCNC: 91 MG/DL (ref 70–110)
HCT VFR BLD AUTO: 40 % (ref 37–48.5)
HCT VFR BLD AUTO: 40.2 % (ref 37–48.5)
HGB BLD-MCNC: 13.1 G/DL (ref 12–16)
HGB BLD-MCNC: 13.1 G/DL (ref 12–16)
IMM GRANULOCYTES # BLD AUTO: 0.03 K/UL (ref 0–0.04)
IMM GRANULOCYTES # BLD AUTO: ABNORMAL K/UL (ref 0–0.04)
IMM GRANULOCYTES NFR BLD AUTO: 0.2 % (ref 0–0.5)
IMM GRANULOCYTES NFR BLD AUTO: ABNORMAL % (ref 0–0.5)
LYMPHOCYTES # BLD AUTO: 12 K/UL (ref 1–4.8)
LYMPHOCYTES NFR BLD: 55 % (ref 18–48)
LYMPHOCYTES NFR BLD: 70.4 % (ref 18–48)
MCH RBC QN AUTO: 30.3 PG (ref 27–31)
MCH RBC QN AUTO: 30.5 PG (ref 27–31)
MCHC RBC AUTO-ENTMCNC: 32.6 G/DL (ref 32–36)
MCHC RBC AUTO-ENTMCNC: 32.8 G/DL (ref 32–36)
MCV RBC AUTO: 93 FL (ref 82–98)
MCV RBC AUTO: 93 FL (ref 82–98)
MONOCYTES # BLD AUTO: 0.9 K/UL (ref 0.3–1)
MONOCYTES NFR BLD: 2 % (ref 4–15)
MONOCYTES NFR BLD: 5.1 % (ref 4–15)
NEUTROPHILS # BLD AUTO: 4 K/UL (ref 1.8–7.7)
NEUTROPHILS NFR BLD: 23.3 % (ref 38–73)
NEUTROPHILS NFR BLD: 41 % (ref 38–73)
NRBC BLD-RTO: 0 /100 WBC
NRBC BLD-RTO: 0 /100 WBC
PLATELET # BLD AUTO: 268 K/UL (ref 150–450)
PLATELET # BLD AUTO: 276 K/UL (ref 150–450)
PLATELET BLD QL SMEAR: ABNORMAL
PLATELET BLD QL SMEAR: ABNORMAL
PMV BLD AUTO: 11 FL (ref 9.2–12.9)
PMV BLD AUTO: 11.2 FL (ref 9.2–12.9)
POTASSIUM SERPL-SCNC: 3.4 MMOL/L (ref 3.5–5.1)
POTASSIUM SERPL-SCNC: 3.5 MMOL/L (ref 3.5–5.1)
PROT SERPL-MCNC: 7.5 G/DL (ref 6–8.4)
PROT SERPL-MCNC: 7.5 G/DL (ref 6–8.4)
RBC # BLD AUTO: 4.3 M/UL (ref 4–5.4)
RBC # BLD AUTO: 4.32 M/UL (ref 4–5.4)
SODIUM SERPL-SCNC: 140 MMOL/L (ref 136–145)
SODIUM SERPL-SCNC: 142 MMOL/L (ref 136–145)
WBC # BLD AUTO: 17 K/UL (ref 3.9–12.7)
WBC # BLD AUTO: 17.08 K/UL (ref 3.9–12.7)

## 2025-02-04 PROCEDURE — 80053 COMPREHEN METABOLIC PANEL: CPT | Performed by: ANESTHESIOLOGY

## 2025-02-04 PROCEDURE — 85025 COMPLETE CBC W/AUTO DIFF WBC: CPT | Performed by: SURGERY

## 2025-02-04 PROCEDURE — 99214 OFFICE O/P EST MOD 30 MIN: CPT | Mod: S$GLB,,, | Performed by: SURGERY

## 2025-02-04 PROCEDURE — 80053 COMPREHEN METABOLIC PANEL: CPT | Mod: 91 | Performed by: SURGERY

## 2025-02-04 PROCEDURE — 99999 PR PBB SHADOW E&M-EST. PATIENT-LVL III: CPT | Mod: PBBFAC,,, | Performed by: SURGERY

## 2025-02-04 PROCEDURE — 36415 COLL VENOUS BLD VENIPUNCTURE: CPT | Performed by: ANESTHESIOLOGY

## 2025-02-04 PROCEDURE — 88275 CYTOGENETICS 100-300: CPT | Mod: 59 | Performed by: INTERNAL MEDICINE

## 2025-02-11 LAB
CLINICAL CYTOGENETICIST REVIEW: NORMAL
CLL GENE MUT ANL BLD/T: NORMAL
CLL, FISH ADDITIONAL INFORMATION: NORMAL
CLL, FISH DISCLAIMER: NORMAL
CLL, FISH RELEASED BY: NORMAL
CLL, FISH RESULT SUMMARY: NORMAL
CLL, FISH RESULT TABLE: NORMAL
FCLL REASON FOR REFERRAL: NORMAL
FCLL SPECIMEN: NORMAL
REF LAB TEST METHOD: NORMAL
SPECIMEN SOURCE: NORMAL

## 2025-02-13 ENCOUNTER — ANESTHESIA EVENT (OUTPATIENT)
Dept: SURGERY | Facility: HOSPITAL | Age: 60
End: 2025-02-13
Payer: COMMERCIAL

## 2025-02-13 NOTE — PRE-PROCEDURE INSTRUCTIONS
The following was discussed with pt via phone and sent to pt portal. Pt verbalized understanding. Pt to be accompanied by her  Ramesh.    Dear Coco ,    You are scheduled for a procedure with Dr. Lorenz on 2/14/2025. Your scheduled arrival time is 5:30am.  This arrival time is roughly 1.5-2 hours before your anticipated procedure time to allow sufficient time for pre-op.  Please wear comfortable clothes.  This procedure will take place at the [facility] at the corner of [facility].  It is in the [facility] next to [facility].  The address is:    [address] [place] [address].  ANTHONY Mahoney [address]    After entering the building, you will proceed to the second floor where you can check in with registration. You should take any medications that you routinely take for blood pressure (other than those listed below), heart medications, thyroid, cholesterol, etc.     If you wear contact lenses, please wear glasses to your procedure.    Your fasting instructions are as follow:  Nothing to eat after midnight the evening before your surgery. Anesthesia encourages you to drink clear liquids up until 2 hours prior to your arrival time to ensure that you are adequately hydrated. You MUST have a responsible adult to bring you home.      The evening before and morning of your procedure, please hold the following medications:  -Aspirin and Aspirin-containing products (Goody's powder, Excedrin)  -NSAIDs (Advil, Ibuprofen, Aleve, Diclofenac)  -Vitamins/Supplements  -Herbal remedies/Teas  -Stimulants (Adderall, Vyvanse, Adipex)  -Diabetic medication (Please bring with you day of procedure)  -Prescription creams/gels/lotions  -HYDROCHLOROTHIAZIDE      -May take Tylenol      The evening before and morning of your procedure, take a shower using antibacterial soap (ex: Hibiclens or Dial antibacterial soap). DO NOT apply deodorant, lotion, cologne, or anything else to the skin. Wear  loose, comfortable fitting clothing. Do not wear jewelry or bring any valuables with you. If you wear dentures or contacts, please bring your case with you or leave them at home. Use and bring any inhalers that you may have.    If you have any procedure-specific questions, please call your surgeon's office. Any other questions, don't hesitate to call at [phone].    ON THE MORNING OF SURGERY, if you experience any issues, please call our Pre-op Desk a call at [phone].    Thanks,  KEARA Ramírez  Pre-Admit Testing  Anesthesia Dept [facility]

## 2025-02-14 ENCOUNTER — PATIENT MESSAGE (OUTPATIENT)
Dept: SURGERY | Facility: HOSPITAL | Age: 60
End: 2025-02-14
Payer: COMMERCIAL

## 2025-02-14 ENCOUNTER — HOSPITAL ENCOUNTER (OUTPATIENT)
Facility: HOSPITAL | Age: 60
Discharge: HOME | End: 2025-02-14
Attending: SURGERY | Admitting: SURGERY
Payer: COMMERCIAL

## 2025-02-14 ENCOUNTER — ANESTHESIA (OUTPATIENT)
Dept: SURGERY | Facility: HOSPITAL | Age: 60
End: 2025-02-14
Payer: COMMERCIAL

## 2025-02-14 VITALS
RESPIRATION RATE: 15 BRPM | BODY MASS INDEX: 26.46 KG/M2 | TEMPERATURE: 98 F | HEART RATE: 66 BPM | OXYGEN SATURATION: 97 % | WEIGHT: 155 LBS | HEIGHT: 64 IN | DIASTOLIC BLOOD PRESSURE: 79 MMHG | SYSTOLIC BLOOD PRESSURE: 179 MMHG

## 2025-02-14 DIAGNOSIS — C50.412 MALIGNANT NEOPLASM OF UPPER-OUTER QUADRANT OF BOTH BREASTS IN FEMALE, ESTROGEN RECEPTOR POSITIVE: ICD-10-CM

## 2025-02-14 DIAGNOSIS — Z17.0 MALIGNANT NEOPLASM OF UPPER-OUTER QUADRANT OF BOTH BREASTS IN FEMALE, ESTROGEN RECEPTOR POSITIVE: ICD-10-CM

## 2025-02-14 DIAGNOSIS — Z85.3 PERSONAL HISTORY OF BREAST CANCER: Primary | ICD-10-CM

## 2025-02-14 DIAGNOSIS — C50.411 MALIGNANT NEOPLASM OF UPPER-OUTER QUADRANT OF BOTH BREASTS IN FEMALE, ESTROGEN RECEPTOR POSITIVE: ICD-10-CM

## 2025-02-14 PROCEDURE — 15772 GRFG AUTOL FAT LIPO EA ADDL: CPT | Mod: ,,, | Performed by: SURGERY

## 2025-02-14 PROCEDURE — 11970 RPLCMT TISS XPNDR PERM IMPLT: CPT | Mod: 50,,, | Performed by: SURGERY

## 2025-02-14 PROCEDURE — 71000033 HC RECOVERY, INTIAL HOUR: Performed by: SURGERY

## 2025-02-14 PROCEDURE — 37000008 HC ANESTHESIA 1ST 15 MINUTES: Performed by: SURGERY

## 2025-02-14 PROCEDURE — 99900035 HC TECH TIME PER 15 MIN (STAT)

## 2025-02-14 PROCEDURE — 88300 SURGICAL PATH GROSS: CPT | Performed by: STUDENT IN AN ORGANIZED HEALTH CARE EDUCATION/TRAINING PROGRAM

## 2025-02-14 PROCEDURE — 15771 GRFG AUTOL FAT LIPO 50 CC/<: CPT | Mod: 51,,, | Performed by: SURGERY

## 2025-02-14 PROCEDURE — 63600175 PHARM REV CODE 636 W HCPCS: Performed by: STUDENT IN AN ORGANIZED HEALTH CARE EDUCATION/TRAINING PROGRAM

## 2025-02-14 PROCEDURE — 63600175 PHARM REV CODE 636 W HCPCS: Performed by: SURGERY

## 2025-02-14 PROCEDURE — 25000003 PHARM REV CODE 250: Performed by: NURSE ANESTHETIST, CERTIFIED REGISTERED

## 2025-02-14 PROCEDURE — 71000015 HC POSTOP RECOV 1ST HR: Performed by: SURGERY

## 2025-02-14 PROCEDURE — 63600175 PHARM REV CODE 636 W HCPCS: Performed by: PHYSICIAN ASSISTANT

## 2025-02-14 PROCEDURE — 37000009 HC ANESTHESIA EA ADD 15 MINS: Performed by: SURGERY

## 2025-02-14 PROCEDURE — 36000706: Performed by: SURGERY

## 2025-02-14 PROCEDURE — C1789 PROSTHESIS, BREAST, IMP: HCPCS | Performed by: SURGERY

## 2025-02-14 PROCEDURE — 25000003 PHARM REV CODE 250: Performed by: PHYSICIAN ASSISTANT

## 2025-02-14 PROCEDURE — 27201423 OPTIME MED/SURG SUP & DEVICES STERILE SUPPLY: Performed by: SURGERY

## 2025-02-14 PROCEDURE — 25000003 PHARM REV CODE 250: Performed by: STUDENT IN AN ORGANIZED HEALTH CARE EDUCATION/TRAINING PROGRAM

## 2025-02-14 PROCEDURE — 63600175 PHARM REV CODE 636 W HCPCS: Performed by: NURSE ANESTHETIST, CERTIFIED REGISTERED

## 2025-02-14 PROCEDURE — 25000003 PHARM REV CODE 250: Performed by: SURGERY

## 2025-02-14 PROCEDURE — 94761 N-INVAS EAR/PLS OXIMETRY MLT: CPT

## 2025-02-14 PROCEDURE — 71000016 HC POSTOP RECOV ADDL HR: Performed by: SURGERY

## 2025-02-14 PROCEDURE — 36000707: Performed by: SURGERY

## 2025-02-14 DEVICE — IMPLANTABLE DEVICE: Type: IMPLANTABLE DEVICE | Site: BREAST | Status: FUNCTIONAL

## 2025-02-14 RX ORDER — CEFAZOLIN 2 G/1
2 INJECTION, POWDER, FOR SOLUTION INTRAMUSCULAR; INTRAVENOUS
Status: COMPLETED | OUTPATIENT
Start: 2025-02-14 | End: 2025-02-14

## 2025-02-14 RX ORDER — DEXAMETHASONE SODIUM PHOSPHATE 4 MG/ML
INJECTION, SOLUTION INTRA-ARTICULAR; INTRALESIONAL; INTRAMUSCULAR; INTRAVENOUS; SOFT TISSUE
Status: DISCONTINUED | OUTPATIENT
Start: 2025-02-14 | End: 2025-02-14

## 2025-02-14 RX ORDER — HYDROMORPHONE HYDROCHLORIDE 1 MG/ML
0.2 INJECTION, SOLUTION INTRAMUSCULAR; INTRAVENOUS; SUBCUTANEOUS EVERY 5 MIN PRN
Status: DISCONTINUED | OUTPATIENT
Start: 2025-02-14 | End: 2025-02-14 | Stop reason: HOSPADM

## 2025-02-14 RX ORDER — PROPOFOL 10 MG/ML
VIAL (ML) INTRAVENOUS
Status: DISCONTINUED | OUTPATIENT
Start: 2025-02-14 | End: 2025-02-14

## 2025-02-14 RX ORDER — SCOPOLAMINE 1 MG/3D
1 PATCH, EXTENDED RELEASE TRANSDERMAL ONCE
Status: DISCONTINUED | OUTPATIENT
Start: 2025-02-14 | End: 2025-02-14 | Stop reason: HOSPADM

## 2025-02-14 RX ORDER — DEXMEDETOMIDINE HYDROCHLORIDE 100 UG/ML
INJECTION, SOLUTION INTRAVENOUS
Status: DISCONTINUED | OUTPATIENT
Start: 2025-02-14 | End: 2025-02-14

## 2025-02-14 RX ORDER — KETAMINE HCL IN 0.9 % NACL 50 MG/5 ML
SYRINGE (ML) INTRAVENOUS
Status: DISCONTINUED | OUTPATIENT
Start: 2025-02-14 | End: 2025-02-14

## 2025-02-14 RX ORDER — MUPIROCIN 20 MG/G
OINTMENT TOPICAL
Status: DISCONTINUED | OUTPATIENT
Start: 2025-02-14 | End: 2025-02-14 | Stop reason: HOSPADM

## 2025-02-14 RX ORDER — FENTANYL CITRATE 50 UG/ML
INJECTION, SOLUTION INTRAMUSCULAR; INTRAVENOUS
Status: DISCONTINUED | OUTPATIENT
Start: 2025-02-14 | End: 2025-02-14

## 2025-02-14 RX ORDER — ROCURONIUM BROMIDE 10 MG/ML
INJECTION, SOLUTION INTRAVENOUS
Status: DISCONTINUED | OUTPATIENT
Start: 2025-02-14 | End: 2025-02-14

## 2025-02-14 RX ORDER — GLUCAGON 1 MG
1 KIT INJECTION
Status: DISCONTINUED | OUTPATIENT
Start: 2025-02-14 | End: 2025-02-14 | Stop reason: HOSPADM

## 2025-02-14 RX ORDER — SODIUM CHLORIDE 0.9 % (FLUSH) 0.9 %
10 SYRINGE (ML) INJECTION
Status: DISCONTINUED | OUTPATIENT
Start: 2025-02-14 | End: 2025-02-14 | Stop reason: HOSPADM

## 2025-02-14 RX ORDER — METHOCARBAMOL 500 MG/1
500 TABLET, FILM COATED ORAL 4 TIMES DAILY
Qty: 40 TABLET | Refills: 0 | Status: SHIPPED | OUTPATIENT
Start: 2025-02-14 | End: 2025-02-24

## 2025-02-14 RX ORDER — LIDOCAINE HYDROCHLORIDE 20 MG/ML
INJECTION INTRAVENOUS
Status: DISCONTINUED | OUTPATIENT
Start: 2025-02-14 | End: 2025-02-14

## 2025-02-14 RX ORDER — LIDOCAINE HYDROCHLORIDE 10 MG/ML
INJECTION, SOLUTION EPIDURAL; INFILTRATION; INTRACAUDAL; PERINEURAL
Status: DISCONTINUED | OUTPATIENT
Start: 2025-02-14 | End: 2025-02-14 | Stop reason: HOSPADM

## 2025-02-14 RX ORDER — EPINEPHRINE 1 MG/ML
INJECTION INTRAMUSCULAR; INTRAVENOUS; SUBCUTANEOUS
Status: DISCONTINUED | OUTPATIENT
Start: 2025-02-14 | End: 2025-02-14 | Stop reason: HOSPADM

## 2025-02-14 RX ORDER — PROCHLORPERAZINE EDISYLATE 5 MG/ML
5 INJECTION INTRAMUSCULAR; INTRAVENOUS EVERY 30 MIN PRN
Status: DISCONTINUED | OUTPATIENT
Start: 2025-02-14 | End: 2025-02-14 | Stop reason: HOSPADM

## 2025-02-14 RX ORDER — MIDAZOLAM HYDROCHLORIDE 1 MG/ML
INJECTION INTRAMUSCULAR; INTRAVENOUS
Status: DISCONTINUED | OUTPATIENT
Start: 2025-02-14 | End: 2025-02-14

## 2025-02-14 RX ORDER — GABAPENTIN 300 MG/1
300 CAPSULE ORAL 3 TIMES DAILY
Qty: 21 CAPSULE | Refills: 0 | Status: SHIPPED | OUTPATIENT
Start: 2025-02-14 | End: 2025-02-21

## 2025-02-14 RX ORDER — LIDOCAINE HYDROCHLORIDE 10 MG/ML
1 INJECTION, SOLUTION EPIDURAL; INFILTRATION; INTRACAUDAL; PERINEURAL ONCE
Status: DISCONTINUED | OUTPATIENT
Start: 2025-02-14 | End: 2025-02-14 | Stop reason: HOSPADM

## 2025-02-14 RX ORDER — SODIUM CHLORIDE 0.9 % (FLUSH) 0.9 %
10 SYRINGE (ML) INJECTION DAILY PRN
Status: DISCONTINUED | OUTPATIENT
Start: 2025-02-14 | End: 2025-02-14 | Stop reason: HOSPADM

## 2025-02-14 RX ORDER — ACETAMINOPHEN 10 MG/ML
INJECTION, SOLUTION INTRAVENOUS
Status: DISCONTINUED | OUTPATIENT
Start: 2025-02-14 | End: 2025-02-14

## 2025-02-14 RX ORDER — MEPERIDINE HYDROCHLORIDE 50 MG/ML
12.5 INJECTION INTRAMUSCULAR; INTRAVENOUS; SUBCUTANEOUS ONCE
Status: COMPLETED | OUTPATIENT
Start: 2025-02-14 | End: 2025-02-14

## 2025-02-14 RX ORDER — IBUPROFEN 600 MG/1
600 TABLET ORAL 3 TIMES DAILY
Qty: 21 TABLET | Refills: 0 | Status: SHIPPED | OUTPATIENT
Start: 2025-02-14 | End: 2025-02-21

## 2025-02-14 RX ORDER — PROPOFOL 10 MG/ML
VIAL (ML) INTRAVENOUS CONTINUOUS PRN
Status: DISCONTINUED | OUTPATIENT
Start: 2025-02-14 | End: 2025-02-14

## 2025-02-14 RX ORDER — TRANEXAMIC ACID 100 MG/ML
INJECTION, SOLUTION INTRAVENOUS
Status: DISCONTINUED | OUTPATIENT
Start: 2025-02-14 | End: 2025-02-14 | Stop reason: HOSPADM

## 2025-02-14 RX ORDER — PHENYLEPHRINE HYDROCHLORIDE 10 MG/ML
INJECTION INTRAVENOUS
Status: DISCONTINUED | OUTPATIENT
Start: 2025-02-14 | End: 2025-02-14

## 2025-02-14 RX ORDER — ONDANSETRON HYDROCHLORIDE 2 MG/ML
INJECTION, SOLUTION INTRAVENOUS
Status: DISCONTINUED | OUTPATIENT
Start: 2025-02-14 | End: 2025-02-14

## 2025-02-14 RX ORDER — OXYCODONE HYDROCHLORIDE 5 MG/1
5 TABLET ORAL EVERY 4 HOURS PRN
Qty: 10 TABLET | Refills: 0 | Status: SHIPPED | OUTPATIENT
Start: 2025-02-14

## 2025-02-14 RX ORDER — OXYCODONE HYDROCHLORIDE 5 MG/1
5 TABLET ORAL
Status: DISCONTINUED | OUTPATIENT
Start: 2025-02-14 | End: 2025-02-14 | Stop reason: HOSPADM

## 2025-02-14 RX ORDER — ACETAMINOPHEN 500 MG
1000 TABLET ORAL EVERY 8 HOURS
Qty: 42 TABLET | Refills: 0 | Status: SHIPPED | OUTPATIENT
Start: 2025-02-14 | End: 2025-02-21

## 2025-02-14 RX ADMIN — SUGAMMADEX 200 MG: 100 INJECTION, SOLUTION INTRAVENOUS at 09:02

## 2025-02-14 RX ADMIN — ROCURONIUM BROMIDE 10 MG: 10 INJECTION, SOLUTION INTRAVENOUS at 09:02

## 2025-02-14 RX ADMIN — LIDOCAINE HYDROCHLORIDE 100 MG: 20 INJECTION INTRAVENOUS at 07:02

## 2025-02-14 RX ADMIN — DEXMEDETOMIDINE HYDROCHLORIDE 4 MCG: 100 INJECTION, SOLUTION INTRAVENOUS at 09:02

## 2025-02-14 RX ADMIN — PHENYLEPHRINE HYDROCHLORIDE 50 MCG: 10 INJECTION INTRAVENOUS at 09:02

## 2025-02-14 RX ADMIN — ROCURONIUM BROMIDE 10 MG: 10 INJECTION, SOLUTION INTRAVENOUS at 08:02

## 2025-02-14 RX ADMIN — PHENYLEPHRINE HYDROCHLORIDE 50 MCG: 10 INJECTION INTRAVENOUS at 08:02

## 2025-02-14 RX ADMIN — REMIFENTANIL HYDROCHLORIDE 0.1 MCG/KG/MIN: 1 INJECTION, POWDER, LYOPHILIZED, FOR SOLUTION INTRAVENOUS at 07:02

## 2025-02-14 RX ADMIN — SCOPOLAMINE 1 PATCH: 1.5 PATCH, EXTENDED RELEASE TRANSDERMAL at 05:02

## 2025-02-14 RX ADMIN — Medication 10 MG: at 08:02

## 2025-02-14 RX ADMIN — PROPOFOL 150 MCG/KG/MIN: 10 INJECTION, EMULSION INTRAVENOUS at 07:02

## 2025-02-14 RX ADMIN — Medication 10 MG: at 09:02

## 2025-02-14 RX ADMIN — ONDANSETRON 4 MG: 2 INJECTION INTRAMUSCULAR; INTRAVENOUS at 07:02

## 2025-02-14 RX ADMIN — DEXAMETHASONE SODIUM PHOSPHATE 8 MG: 4 INJECTION INTRA-ARTICULAR; INTRALESIONAL; INTRAMUSCULAR; INTRAVENOUS; SOFT TISSUE at 07:02

## 2025-02-14 RX ADMIN — PHENYLEPHRINE HYDROCHLORIDE 100 MCG: 10 INJECTION INTRAVENOUS at 08:02

## 2025-02-14 RX ADMIN — SODIUM CHLORIDE: 0.9 INJECTION, SOLUTION INTRAVENOUS at 07:02

## 2025-02-14 RX ADMIN — ROCURONIUM BROMIDE 50 MG: 10 INJECTION, SOLUTION INTRAVENOUS at 07:02

## 2025-02-14 RX ADMIN — MUPIROCIN: 20 OINTMENT TOPICAL at 05:02

## 2025-02-14 RX ADMIN — PROPOFOL 150 MG: 10 INJECTION, EMULSION INTRAVENOUS at 07:02

## 2025-02-14 RX ADMIN — SUGAMMADEX 100 MG: 100 INJECTION, SOLUTION INTRAVENOUS at 09:02

## 2025-02-14 RX ADMIN — MIDAZOLAM HYDROCHLORIDE 2 MG: 2 INJECTION, SOLUTION INTRAMUSCULAR; INTRAVENOUS at 07:02

## 2025-02-14 RX ADMIN — CEFAZOLIN 2 G: 2 INJECTION, POWDER, FOR SOLUTION INTRAMUSCULAR; INTRAVENOUS at 07:02

## 2025-02-14 RX ADMIN — SODIUM CHLORIDE: 0.9 INJECTION, SOLUTION INTRAVENOUS at 08:02

## 2025-02-14 RX ADMIN — Medication 20 MG: at 07:02

## 2025-02-14 RX ADMIN — FENTANYL CITRATE 100 MCG: 50 INJECTION, SOLUTION INTRAMUSCULAR; INTRAVENOUS at 07:02

## 2025-02-14 RX ADMIN — MEPERIDINE HYDROCHLORIDE 12.5 MG: 50 INJECTION INTRAMUSCULAR; INTRAVENOUS; SUBCUTANEOUS at 10:02

## 2025-02-14 RX ADMIN — ACETAMINOPHEN 1000 MG: 10 INJECTION INTRAVENOUS at 07:02

## 2025-02-14 NOTE — OP NOTE
[facility] ([facility])  Plastic Surgery  Operative Note    SUMMARY     Date of Procedure: 2/14/2025     Location:  [facility]    Procedure(s): Procedure(s) (LRB):  REPLACEMENT, IMPLANT, BREAST (Bilateral)  LIPOSUCTION, WITH FAT GRAFTING TO TRUNK, BREASTS, SCALP, ARMS, AND/OR LEGS, 50CC OR LESS INJECTABLE (Bilateral)     Bilateral tissue expander removal with placement of breast implants   Major revision of bilateral reconstructed breasts with excision of the excess lower pole skin  Fat grafting to the right reconstructed breast 110 cc   Fat grafting to the left reconstructed breast 120 cc    Surgeons and Role:     * Roberto Lorenz, DO - Primary    Assistant: NALINI Duran    Pre-Operative Diagnosis: 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  In late October of 2024 she underwent bilateral skin sparing mastectomy, wise pattern skin reduction, and tissue expander placement with ADM.  She had an uneventful course postoperatively.  She now ready for her second-stage reconstruction.  She is happy with the size of her implants does have some excess lower pole skin    Operative Findings (including complications, if any):   2 L tumescent solution used   1300 cc total lipoaspirate  120 cc of fat was injected into the left breast   110 cc of fat was injected into the right breast  Bilateral placement of 435 cc moderate high boost smooth round silicone implants   Excision of lower pole skin along the IMF on both sides to remove excess tissue implant    Description of Procedures:  The patient is seen in the preoperative holding area.  All questions were answered.  Surgical consent was signed.  Areas of lipodystrophy of the abdomen were marked for  liposuction.  Areas of volume deficiency on the pole were also marked.  I marked ellipse of skin the lower pole that I planned to remove as well to help with the lower pole contour.  The patient is taken the operating room general was induced.  Both breasts and the abdomen were prepped and draped in the sterile fashion.      I began by instilling tumescent solution.  Three small incisions were made in the midline and along the semilunaris also small stab incisions were made a previous breast CHARLINE drain sites.  Through that a total of 2 L of cement tumescent solution was instilled to the chest wall lateral to the breasts, the bilateral flanks and the upper and lower abdomen.  While that has allowed to sit I turned our attention of the breasts.      Both sides I made an incision at the new position of the IMF which just slightly below her old IMF incision.  Using the Bovie the capsule was entered.  Each tissue expander was ruptured and removed.  The antibiotic discs were also removed.  The expanders were sent for gross identification.  The pockets were inspected.  She had an anterior sling with her ADM.  It was well incorporated circumferentially.  A next we selected 2 sizers 404 135 cc moderate high boost implants.  The forearm 35 cc sizers fit her pockets better more of a like to like replacement with her expanders.  The skin was then temporarily stapled closed.      Next liposuction was performed.  Using a 3 mm Idalmis cannula with the power assisted liposuction liposuction was performed to the bilateral flanks in the upper and lower abdomen.  The fat was collected into the Kwaga vibrating canister.  In it all 1300 cc of lipoaspirate was pull of the lot of this was the tumescent fluid.  A 4 mm basket cannula was then used on the lateral chest wall lateral to the breasts to break up some tethered scar skin contour.    The  tumescent solution was then drained out of the canister.  We washed the fat  with the additional 1 L of warmed lactated Ringer's solution.  It is also allowed to separate.  The remaining fat was then drawn into 10 cc syringes.    Fat grafting was done to each breast.  Beginning on the right side around the Sizer I fat grafted the upper pole step-off medially.  The Sizer was then removed.  The mastectomy skin flap was then globally fat grafted with a total of 110 cc of fat   The same the steps were repeated on the left side.  I initially around the Sizer and then switched to fat grafting of the skin flap globally and a total of 120 cc was used on that side    Both pockets were then irrigated with a dilute Betadine followed by Vashe solution.  The skin was cleaned with Betadine.  I changed gloves.  A Peacock funnel was used.  Using a no-touch technique the foreign 35 moderate high boost implant was placed into each pocket.  The capsule was closed on each side with a running 3-0 PDS suture.      The patient had some additional loose lower pole skin.  I extended the IMF incisions along the new IMF and also remove some excess lower pole skin.  This removed her previous IMF scar and also some thickened edematous skin above it.  That has done on each side.  The deep dermis was closed with a interrupted 3-0 Monocryl suture.  Finally the subcuticular layer was closed with a running 3-0 Stratafix.      The incision was dressed with Dermabond.  The liposuction sites were closed with 5 0 nylon sutures.  The patient was padded and a postoperative bra and a girdle.  She tolerated the procedure well taken to recovery in stable condition    Significant Surgical Tasks Conducted by the Assistant(s), if Applicable: The presence of Alexandra Maurer PA-C as first assistant was required due to the complexity of the procedure relative to any available residents. I certify that no resident was available who was qualified to serve as first assistant. Duties performed by the assistant included assisting the primary  surgeon    Estimated Blood Loss (EBL): 30mL           Implants:   Implant Name Type Inv. Item Serial No.  Lot No. LRB No. Used Action   [place] MemoryGelBOOST Breast implant Breast  [id number] MENTOR ALBERTO  Left 1 Implanted   [place] MemoryGel BOOSTbreast implant Breast  [id number] MENTOR ALBERTO  Right 1 Implanted       Specimens:   Specimen (24h ago, onward)       Start     Ordered    02/14/25 0844  Specimen to Pathology, Surgery Breast  Once        Comments: Pre-op Diagnosis: 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, estrogen receptor positive [C50.411, Z17.0, C50.412]Procedure(s):REPLACEMENT, IMPLANT, BREASTLIPOSUCTION, WITH FAT GRAFTING TO TRUNK, BREASTS, SCALP, ARMS, AND/OR LEGS, 50CC OR LESS INJECTABLE Number of specimens: 1Name of specimens: 1)  Bilateral breast expanders (GROSS ONLY)     References:    Click here for ordering Quick Tip   Question Answer Comment   Procedure Type: Breast    Specimen Class: Routine/Screening    Release to patient Immediate        02/14/25 0839                            Condition: Good    Disposition: PACU - hemodynamically stable., DC home, follow up next week    Attestation: I was present and scrubbed for the entire procedure.

## 2025-02-14 NOTE — BRIEF OP NOTE
[facility]  Brief Operative Note    Surgery Date: 2/14/2025     Surgeons and Role:     * Roberto Lorenz, DO - Primary    Assisting Surgeon: None    Pre-op Diagnosis:  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, estrogen receptor positive [C50.411, Z17.0, C50.412]    Post-op Diagnosis:  Post-Op Diagnosis Codes:     * 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, estrogen receptor positive [C50.411, Z17.0, C50.412]    Procedure(s) (LRB):  REPLACEMENT, IMPLANT, BREAST (Bilateral)  LIPOSUCTION, WITH FAT GRAFTING TO TRUNK, BREASTS, SCALP, ARMS, AND/OR LEGS, 50CC OR LESS INJECTABLE (Bilateral)    Anesthesia: General    Operative Findings: bilateral implant exchange and fat grafting    Estimated Blood Loss: * No values recorded between 2/14/2025  7:54 AM and 2/14/2025  9:49 AM *         Specimens:   Specimen (24h ago, onward)       Start     Ordered    02/14/25 0844  Specimen to Pathology, Surgery Breast  Once        Comments: Pre-op Diagnosis: 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, estrogen receptor positive [C50.411, Z17.0, C50.412]Procedure(s):REPLACEMENT, IMPLANT, BREASTLIPOSUCTION, WITH FAT GRAFTING TO TRUNK, BREASTS, SCALP, ARMS, AND/OR LEGS, 50CC OR LESS INJECTABLE Number of specimens: 1Name of specimens: 1)  Bilateral breast expanders (GROSS ONLY)     References:    Click here for ordering Quick Tip   Question Answer Comment   Procedure Type: Breast    Specimen Class: Routine/Screening    Release to patient Immediate        02/14/25 0845                      Discharge Note    OUTCOME: Patient tolerated treatment/procedure well without complication and is now ready for discharge.    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, estrogen receptor positive    FOLLOWUP: In clinic    DISCHARGE INSTRUCTIONS:    Discharge  Procedure Orders   Notify your health care provider if you experience any of the following:  increased confusion or weakness     Notify your health care provider if you experience any of the following:  persistent dizziness, light-headedness, or visual disturbances     Notify your health care provider if you experience any of the following:  worsening rash     Notify your health care provider if you experience any of the following:  severe persistent headache     Notify your health care provider if you experience any of the following:  difficulty breathing or increased cough     Notify your health care provider if you experience any of the following:  redness, tenderness, or signs of infection (pain, swelling, redness, odor or green/yellow discharge around incision site)     Notify your health care provider if you experience any of the following:  severe uncontrolled pain     Notify your health care provider if you experience any of the following:  persistent nausea and vomiting or diarrhea     Notify your health care provider if you experience any of the following:  temperature >100.4     Leave dressing on - Keep it clean, dry, and intact until clinic visit

## 2025-02-14 NOTE — ANESTHESIA POSTPROCEDURE EVALUATION
Anesthesia Post Evaluation    Patient: Coco Roger    Procedure(s) Performed: Procedure(s) (LRB):  REPLACEMENT, IMPLANT, BREAST (Bilateral)  LIPOSUCTION, WITH FAT GRAFTING TO TRUNK, BREASTS, SCALP, ARMS, AND/OR LEGS, 50CC OR LESS INJECTABLE (Bilateral)    Final Anesthesia Type: general      Patient location during evaluation: PACU  Patient participation: Yes- Able to Participate  Level of consciousness: awake and alert, oriented and awake  Post-procedure vital signs: reviewed and stable  Pain management: adequate  Airway patency: patent  ARABELLA mitigation strategies: Multimodal analgesia  PONV status at discharge: No PONV  Anesthetic complications: no      Cardiovascular status: blood pressure returned to baseline and hemodynamically stable  Respiratory status: unassisted and spontaneous ventilation  Hydration status: euvolemic  Follow-up not needed.              Vitals Value Taken Time   /77 02/14/25 1047   Temp 36.5 °C (97.7 °F) 02/14/25 0955   Pulse 66 02/14/25 1048   Resp 23 02/14/25 1048   SpO2 98 % 02/14/25 1048   Vitals shown include unfiled device data.      Event Time   Out of Recovery 10:08:00         Pain/Bora Score: Pain Rating Prior to Med Admin: 3 (2/14/2025 10:28 AM)  Bora Score: 10 (2/14/2025 10:08 AM)

## 2025-02-14 NOTE — PLAN OF CARE
Discharge instructions reviewed with patient and family member. Pt verbalizes understanding. Questions and concerns addressed.  PIV removed with catheter intact.  Patient denies any acute pain or discomfort. Escorted to vehicle via wheelchair.

## 2025-02-14 NOTE — H&P (VIEW-ONLY)
Plastic Surgery History & Physical    SUBJECTIVE:   Chief complaint: Preoperative Visit for second stage implant based reconstruction    History of Present Illness:  59 y.o. female presenting to plastic surgery clinic for a preoperative visit. 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 She currently has 375 expanders in place filled with 380 cc of saline.  She is happy with her size. She is scheduled for second stage on 25. Since the last clinic visit there have been no significant changes in the patient's history.    Past Medical History:   Diagnosis Date    Abnormal Pap smear of cervix age 20    Abnormal Pap smear of vagina     pt was 20 yr of age    Hypertension     PONV (postoperative nausea and vomiting)        Past Surgical History:   Procedure Laterality Date     SECTION      CYSTOSCOPY      ENDOMETRIAL ABLATION      INJECTION FOR SENTINEL NODE IDENTIFICATION Bilateral 10/30/2024    Procedure: INJECTION, FOR SENTINEL NODE IDENTIFICATION;  Surgeon: Ileana Patel MD;  [facility];  Service: General;  Laterality: Bilateral;    INJECTION, AGENT, INTRAVENOUS, FOR ASSESSMENT OF BLOOD FLOW IN FLAP OR GRAFT Bilateral 10/30/2024    Procedure: INJECTION, AGENT, INTRAVENOUS, FOR ASSESSMENT OF BLOOD FLOW IN FLAP OR GRAFT / ( ICG ) / INDOCYANINE GREEN -;  Surgeon: Roberto Lorenz DO;  Location: [facility] OR;  Service: General;  Laterality: Bilateral;    INSERTION OF BREAST TISSUE EXPANDER Bilateral 10/30/2024    Procedure: INSERTION, TISSUE EXPANDER, BREAST;  Surgeon: Roberto Lorenz DO;  Location: [facility] OR;  Service: General;  Laterality: Bilateral;    INSERTION, BIOLOGIC IMPLANT, FOR SOFT TISSUE REINFORCEMENT Bilateral 10/30/2024    Procedure: INSERTION, BIOLOGIC IMPLANT, FOR SOFT TISSUE REINFORCEMENT / ACELLULAR DERMIS MATRIX;  Surgeon: Roberto Lorenz DO;  Location: [facility] OR;  Service: General;  Laterality:  Bilateral;    MASTECTOMY Bilateral 10/30/2024    Procedure: MASTECTOMY-Bilateral;  Surgeon: Ileana Patel MD;  Location: [facility] [place] OR;  Service: General;  Laterality: Bilateral;  5 hours    SENTINEL LYMPH NODE BIOPSY Bilateral 10/30/2024    Procedure: BIOPSY, LYMPH NODE, SENTINEL-Bilateral;  Surgeon: Ileana Patel MD;  Location: [facility];  Service: General;  Laterality: Bilateral;    TONSILLECTOMY      TUBAL LIGATION         Family History   Problem Relation Name Age of Onset    Breast cancer Sister      Colon cancer Neg Hx      Ovarian cancer Neg Hx         Social History     Socioeconomic History    Marital status:    Tobacco Use    Smoking status: Never    Smokeless tobacco: Never   Substance and Sexual Activity    Alcohol use: Never    Drug use: Never    Sexual activity: Yes     Partners: Male     Birth control/protection: Post-menopausal     Comment:      Social Drivers of Health     Financial Resource Strain: Low Risk  (10/31/2024)    Overall Financial Resource Strain (CARDIA)     Difficulty of Paying Living Expenses: Not very hard   Food Insecurity: No Food Insecurity (10/31/2024)    Hunger Vital Sign     Worried About Running Out of Food in the Last Year: Never true     Ran Out of Food in the Last Year: Never true   Transportation Needs: No Transportation Needs (10/31/2024)    TRANSPORTATION NEEDS     Transportation : No   Physical Activity: Insufficiently Active (9/18/2024)    Exercise Vital Sign     Days of Exercise per Week: 2 days     Minutes of Exercise per Session: 60 min   Stress: Stress Concern Present (10/31/2024)    [employer] - Occupational Stress Questionnaire     Feeling of Stress : To some extent   Housing Stability: Unknown (10/31/2024)    Housing Stability Vital Sign     Unable to Pay for Housing in the Last Year: No     Homeless in the Last Year: No       No current facility-administered medications for this visit.     No current outpatient  medications on file.     Facility-Administered Medications Ordered in Other Visits   Medication Dose Route Frequency Provider Last Rate Last Admin    ceFAZolin 2 g  2 g Intravenous On Call Procedure Alexandra Maurer PA-C        LIDOcaine (PF) 10 mg/ml (1%) injection 10 mg  1 mL Intradermal Once Alexandra Maurer PA-C        mupirocin 2 % ointment   Nasal On Call Procedure Alexandra Maurer PA-C   Given at 02/14/25 0539    scopolamine 1.3-1.5 mg (1 mg over 3 days) 1 patch  1 patch Transdermal Once Randolph Reveles MD   1 patch at 02/14/25 0539    sodium chloride 0.9% flush 10 mL  10 mL Intravenous PRN Alexandra Maurer PA-C           Review of patient's allergies indicates:   Allergen Reactions    Bactrim [sulfamethoxazole-trimethoprim] Hives    Epinephrine      shaking           OBJECTIVE:     LMP 02/05/2016       Physical Exam:  Gen: NAD, appears stated age  Neuro: normal without focal findings, mental status and speech normal  HEENT: NCAT, neck supple, PEERL  CV: RRR  Pulm: Breathing non-labored, chest wall movement equal bilaterally   Breast: Exam deferred, reviewed medical photography  Abdomen: soft, nontender, no guarding  Extremity:normal strength, no cyanosis or edema  Skin: Skin color, texture, turgor normal. No rashes or lesions  Psych: oriented to time, place and person          ASSESSMENT/PLAN:     Coco Roger was seen preoperatively today for second stage implant based reconstruction.  The operative plan is for bilateral implant exchange, liposuction and fat grafting to both breasts.  We discussed details of implant exchange and additional  revisions including possible tailoring of the skin envelope, repositioning/exchange of the implant, capsulotomy or capsulorraphy, liposuction and fat grafting. Risks of implant based reconstruction including but not limited to hematoma, seroma, infection, extrusion, capsular contracture, risks of rupture, need for replacement later in life, poor cosmetic  outcome, need for reoperation, changes in sensation, fat necrosis and BII/BRANDIE-ALCL. Risks of liposuction include hematoma, seroma, contour deformity, dimpling, displeasing cosmesis.    Surgical consent as well as patient implant decision checklists were reviewed and signed. Advised patient to wear post operative garment as instructed.     Reviewed multimodal pain control regimen used in the post operative period. Prescriptions will be sent to the outpatient pharmacy the day of surgery.  The patient was given a packet including general instructions for post-operative care, instructions for the day of surgery, drain care and process to complete FMLA/Disability paperwork.  All questions were answered. The patient was given a business card with contact info and advised to contact the clinic with any questions or concerns before or after surgery.      Ramon Lorenz, DO  Plastic and Reconstructive Surgery        I spent a total of 30 minutes on the day of the visit.  This includes face to face time and non-face to face time preparing to see the patient (eg, review of tests), obtaining and/or reviewing separately obtained history, documenting clinical information in the electronic or other health record, independently interpreting results and communicating results to the patient/family/caregiver, or care coordinator.

## 2025-02-14 NOTE — TRANSFER OF CARE
"Anesthesia Transfer of Care Note    Patient: Coco Roger    Procedure(s) Performed: Procedure(s) (LRB):  REPLACEMENT, IMPLANT, BREAST (Bilateral)  LIPOSUCTION, WITH FAT GRAFTING TO TRUNK, BREASTS, SCALP, ARMS, AND/OR LEGS, 50CC OR LESS INJECTABLE (Bilateral)    Patient location: PACU    Anesthesia Type: general    Transport from OR: Transported from OR on 6-10 L/min O2 by face mask with adequate spontaneous ventilation    Post pain: adequate analgesia    Post assessment: no apparent anesthetic complications and tolerated procedure well    Post vital signs: stable    Level of consciousness: responds to stimulation and sedated    Nausea/Vomiting: no nausea/vomiting    Complications: none    Transfer of care protocol was followed      Last vitals: Visit Vitals  BP (!) 170/79 (BP Location: Left arm, Patient Position: Lying)   Pulse 62   Temp 37.3 °C (99.1 °F) (Temporal)   Resp 20   Ht 5' 4" (1.626 m)   Wt 70.3 kg (155 lb)   LMP 02/05/2016   SpO2 100%   Breastfeeding No   BMI 26.61 kg/m²     "

## 2025-02-14 NOTE — PROGRESS NOTES
Plastic Surgery History & Physical    SUBJECTIVE:   Chief complaint: Preoperative Visit for second stage implant based reconstruction    History of Present Illness:  59 y.o. female presenting to plastic surgery clinic for a preoperative visit. 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 She currently has 375 expanders in place filled with 380 cc of saline.  She is happy with her size. She is scheduled for second stage on 25. Since the last clinic visit there have been no significant changes in the patient's history.    Past Medical History:   Diagnosis Date    Abnormal Pap smear of cervix age 20    Abnormal Pap smear of vagina     pt was 20 yr of age    Hypertension     PONV (postoperative nausea and vomiting)        Past Surgical History:   Procedure Laterality Date     SECTION      CYSTOSCOPY      ENDOMETRIAL ABLATION      INJECTION FOR SENTINEL NODE IDENTIFICATION Bilateral 10/30/2024    Procedure: INJECTION, FOR SENTINEL NODE IDENTIFICATION;  Surgeon: Ileana Patel MD;  Location: [facility];  Service: General;  Laterality: Bilateral;    INJECTION, AGENT, INTRAVENOUS, FOR ASSESSMENT OF BLOOD FLOW IN FLAP OR GRAFT Bilateral 10/30/2024    Procedure: INJECTION, AGENT, INTRAVENOUS, FOR ASSESSMENT OF BLOOD FLOW IN FLAP OR GRAFT / ( ICG ) / INDOCYANINE GREEN -;  Surgeon: Roberto Lorenz DO;  Location: [facility] OR;  Service: General;  Laterality: Bilateral;    INSERTION OF BREAST TISSUE EXPANDER Bilateral 10/30/2024    Procedure: INSERTION, TISSUE EXPANDER, BREAST;  Surgeon: Roberto Lorenz DO;  Location: [facility] OR;  Service: General;  Laterality: Bilateral;    INSERTION, BIOLOGIC IMPLANT, FOR SOFT TISSUE REINFORCEMENT Bilateral 10/30/2024    Procedure: INSERTION, BIOLOGIC IMPLANT, FOR SOFT TISSUE REINFORCEMENT / ACELLULAR DERMIS MATRIX;  Surgeon: Roberto Lorenz DO;  Location: [facility] OR;  Service: General;  Laterality:  Bilateral;    MASTECTOMY Bilateral 10/30/2024    Procedure: MASTECTOMY-Bilateral;  Surgeon: Ileana Patel MD;  Location: [facility] OR;  Service: General;  Laterality: Bilateral;  5 hours    SENTINEL LYMPH NODE BIOPSY Bilateral 10/30/2024    Procedure: BIOPSY, LYMPH NODE, SENTINEL-Bilateral;  Surgeon: Ileana Patel MD;  Location: [facility];  Service: General;  Laterality: Bilateral;    TONSILLECTOMY      TUBAL LIGATION         Family History   Problem Relation Name Age of Onset    Breast cancer Sister      Colon cancer Neg Hx      Ovarian cancer Neg Hx         Social History     Socioeconomic History    Marital status:    Tobacco Use    Smoking status: Never    Smokeless tobacco: Never   Substance and Sexual Activity    Alcohol use: Never    Drug use: Never    Sexual activity: Yes     Partners: Male     Birth control/protection: Post-menopausal     Comment:      Social Drivers of Health     Financial Resource Strain: Low Risk  (10/31/2024)    Overall Financial Resource Strain (CARDIA)     Difficulty of Paying Living Expenses: Not very hard   Food Insecurity: No Food Insecurity (10/31/2024)    Hunger Vital Sign     Worried About Running Out of Food in the Last Year: Never true     Ran Out of Food in the Last Year: Never true   Transportation Needs: No Transportation Needs (10/31/2024)    TRANSPORTATION NEEDS     Transportation : No   Physical Activity: Insufficiently Active (9/18/2024)    Exercise Vital Sign     Days of Exercise per Week: 2 days     Minutes of Exercise per Session: 60 min   Stress: Stress Concern Present (10/31/2024)    [employer] - Occupational Stress Questionnaire     Feeling of Stress : To some extent   Housing Stability: Unknown (10/31/2024)    Housing Stability Vital Sign     Unable to Pay for Housing in the Last Year: No     Homeless in the Last Year: No       No current facility-administered medications for this visit.     No current outpatient  medications on file.     Facility-Administered Medications Ordered in Other Visits   Medication Dose Route Frequency Provider Last Rate Last Admin    ceFAZolin 2 g  2 g Intravenous On Call Procedure Alexandra Maurer PA-C        LIDOcaine (PF) 10 mg/ml (1%) injection 10 mg  1 mL Intradermal Once Alexandra Maurer PA-C        mupirocin 2 % ointment   Nasal On Call Procedure Alexandra Maurer PA-C   Given at 02/14/25 0539    scopolamine 1.3-1.5 mg (1 mg over 3 days) 1 patch  1 patch Transdermal Once Randolph Reveles MD   1 patch at 02/14/25 0539    sodium chloride 0.9% flush 10 mL  10 mL Intravenous PRN Alexandra Maurer PA-C           Review of patient's allergies indicates:   Allergen Reactions    Bactrim [sulfamethoxazole-trimethoprim] Hives    Epinephrine      shaking           OBJECTIVE:     LMP 02/05/2016       Physical Exam:  Gen: NAD, appears stated age  Neuro: normal without focal findings, mental status and speech normal  HEENT: NCAT, neck supple, PEERL  CV: RRR  Pulm: Breathing non-labored, chest wall movement equal bilaterally   Breast: Exam deferred, reviewed medical photography  Abdomen: soft, nontender, no guarding  Extremity:normal strength, no cyanosis or edema  Skin: Skin color, texture, turgor normal. No rashes or lesions  Psych: oriented to time, place and person          ASSESSMENT/PLAN:     Coco Roger was seen preoperatively today for second stage implant based reconstruction.  The operative plan is for bilateral implant exchange, liposuction and fat grafting to both breasts.  We discussed details of implant exchange and additional  revisions including possible tailoring of the skin envelope, repositioning/exchange of the implant, capsulotomy or capsulorraphy, liposuction and fat grafting. Risks of implant based reconstruction including but not limited to hematoma, seroma, infection, extrusion, capsular contracture, risks of rupture, need for replacement later in life, poor cosmetic  outcome, need for reoperation, changes in sensation, fat necrosis and BII/BRANDIE-ALCL. Risks of liposuction include hematoma, seroma, contour deformity, dimpling, displeasing cosmesis.    Surgical consent as well as patient implant decision checklists were reviewed and signed. Advised patient to wear post operative garment as instructed.     Reviewed multimodal pain control regimen used in the post operative period. Prescriptions will be sent to the outpatient pharmacy the day of surgery.  The patient was given a packet including general instructions for post-operative care, instructions for the day of surgery, drain care and process to complete FMLA/Disability paperwork.  All questions were answered. The patient was given a business card with contact info and advised to contact the clinic with any questions or concerns before or after surgery.      Ramon Lorenz, DO  Plastic and Reconstructive Surgery        I spent a total of 30 minutes on the day of the visit.  This includes face to face time and non-face to face time preparing to see the patient (eg, review of tests), obtaining and/or reviewing separately obtained history, documenting clinical information in the electronic or other health record, independently interpreting results and communicating results to the patient/family/caregiver, or care coordinator.

## 2025-02-14 NOTE — PLAN OF CARE
Chart reviewed. Preop nursing care completed per orders. Safe surgery checklist complete. Pt denies any open wounds cuts or sores. Pt denies any metal in body or use of weight loss injections. Pt AAOX3, VSS on room air. Pt toileted, Bed locked in lowest position, Call light within reach. Pt denies any needs at this time. Belongings placed in locker 1

## 2025-02-14 NOTE — ANESTHESIA PROCEDURE NOTES
Intubation    Date/Time: 2/14/2025 7:26 AM    Performed by: Angi Berumen CRNA  Authorized by: Angi Berumen CRNA    Intubation:     Induction:  Intravenous    Intubated:  Postinduction    Mask Ventilation:  Easy mask    Attempts:  1    Attempted By:  CRNA    Method of Intubation:  Video laryngoscopy    Blade:  Fernandez 3    Laryngeal View Grade: Grade I - full view of cords      Difficult Airway Encountered?: No      Complications:  None    Airway Device:  Oral endotracheal tube    Airway Device Size:  7.0    Style/Cuff Inflation:  Cuffed (inflated to minimal occlusive pressure)    Tube secured:  21    Secured at:  The lips    Placement Verified By:  Capnometry    Complicating Factors:  Large prominent central incisors    Findings Post-Intubation:  BS equal bilateral and atraumatic/condition of teeth unchanged

## 2025-02-14 NOTE — ANESTHESIA PREPROCEDURE EVALUATION
[facility]  Anesthesia Pre-Operative Evaluation         Patient Name: Coco Roger  YOB: 1965  MRN: [id number]    SUBJECTIVE:     Pre-operative evaluation for Procedure(s) (LRB):  REPLACEMENT, IMPLANT, BREAST (Bilateral)  LIPOSUCTION, WITH FAT GRAFTING TO TRUNK, BREASTS, SCALP, ARMS, AND/OR LEGS, 50CC OR LESS INJECTABLE (Bilateral)     02/13/2025    Coco Roger is a 59 y.o. female w/ a significant PMHx of previous BL mastectomy for breat cancer, HTN  Patient now presents for the above procedure(s).    TTE:   none documented.     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     Current Outpatient Medications on File Prior to Encounter   Medication Sig Dispense Refill    acetaminophen (TYLENOL) 500 MG tablet Take 2 tablets (1,000 mg total) by mouth every 6 (six) hours. 30 tablet 0    atenolol (TENORMIN) 50 MG tablet 75 mg.   3    citalopram (CELEXA) 10 MG tablet Take 1 tablet (10 mg total) by mouth once daily. 30 tablet 2    diazePAM (VALIUM) 2 MG tablet Take 1 tablet (2 mg total) by mouth every 8 (eight) hours as needed for Anxiety. 10 tablet 0    ezetimibe (ZETIA) 10 mg tablet Take 10 mg by mouth.      gabapentin (NEURONTIN) 300 MG capsule Take 1 capsule (300 mg total) by mouth 3 (three) times daily. for 7 days 21 capsule 0    hydroCHLOROthiazide (HYDRODIURIL) 25 MG tablet Take 1 tablet (25 mg total) by mouth once daily. 90 tablet 3    hydroCHLOROthiazide (HYDRODIURIL) 25 MG tablet Take 1 tablet (25 mg total) by mouth once daily. 30 tablet 11    ibuprofen  (ADVIL,MOTRIN) 600 MG tablet Take 1 tablet (600 mg total) by mouth 3 (three) times daily. (Patient not taking: Reported on 2025) 60 tablet 0    oxyCODONE (ROXICODONE) 5 MG immediate release tablet Take 1 tablet (5 mg total) by mouth every 4 (four) hours as needed for Pain. 10 tablet 0       Past Surgical History:   Procedure Laterality Date     SECTION      CYSTOSCOPY      ENDOMETRIAL ABLATION      INJECTION FOR SENTINEL NODE IDENTIFICATION Bilateral 10/30/2024    Procedure: INJECTION, FOR SENTINEL NODE IDENTIFICATION;  Surgeon: Ileana Patel MD;  Location: [facility];  Service: General;  Laterality: Bilateral;    INJECTION, AGENT, INTRAVENOUS, FOR ASSESSMENT OF BLOOD FLOW IN FLAP OR GRAFT Bilateral 10/30/2024    Procedure: INJECTION, AGENT, INTRAVENOUS, FOR ASSESSMENT OF BLOOD FLOW IN FLAP OR GRAFT / ( ICG ) / INDOCYANINE GREEN -;  Surgeon: Roberto Lorenz DO;  Location: [facility];  Service: General;  Laterality: Bilateral;    INSERTION OF BREAST TISSUE EXPANDER Bilateral 10/30/2024    Procedure: INSERTION, TISSUE EXPANDER, BREAST;  Surgeon: Roberto Lorenz DO;  Location: [facility] OR;  Service: General;  Laterality: Bilateral;    INSERTION, BIOLOGIC IMPLANT, FOR SOFT TISSUE REINFORCEMENT Bilateral 10/30/2024    Procedure: INSERTION, BIOLOGIC IMPLANT, FOR SOFT TISSUE REINFORCEMENT / ACELLULAR DERMIS MATRIX;  Surgeon: Roberto Lorenz DO;  Location: [facility];  Service: General;  Laterality: Bilateral;    MASTECTOMY Bilateral 10/30/2024    Procedure: MASTECTOMY-Bilateral;  Surgeon: Ileana Patel MD;  Location: [facility] OR;  Service: General;  Laterality: Bilateral;  5 hours    SENTINEL LYMPH NODE BIOPSY Bilateral 10/30/2024    Procedure: BIOPSY, LYMPH NODE, SENTINEL-Bilateral;  Surgeon: Ileana Patel MD;  Location: [facility] OR;  Service: General;  Laterality: Bilateral;    TONSILLECTOMY      TUBAL LIGATION         OBJECTIVE:     Vital Signs Range (Last 24H):         Significant Labs:  Lab Results    Component Value Date    WBC 17.08 (H) 02/04/2025    WBC 17.00 (H) 02/04/2025    HGB 13.1 02/04/2025    HGB 13.1 02/04/2025    HCT 40.2 02/04/2025    HCT 40.0 02/04/2025     02/04/2025     02/04/2025    ALT 14 02/04/2025    ALT 17 02/04/2025    AST 21 02/04/2025    AST 22 02/04/2025     02/04/2025     02/04/2025    K 3.4 (L) 02/04/2025    K 3.5 02/04/2025     02/04/2025     02/04/2025    CREATININE 0.9 02/04/2025    CREATININE 0.9 02/04/2025    BUN 19 02/04/2025    BUN 20 02/04/2025    CO2 27 02/04/2025    CO2 26 02/04/2025       Diagnostic Studies: No relevant studies.    EKG:   Results for orders placed or performed during the hospital encounter of 10/14/24   EKG 12-lead    Collection Time: 10/14/24  9:07 AM   Result Value Ref Range    QRS Duration 84 ms    [facility] QTC Calculation 390 ms    Narrative    Test Reason : Z01.818,    Vent. Rate : 063 BPM     Atrial Rate : 063 BPM     P-R Int : 136 ms          QRS Dur : 084 ms      QT Int : 382 ms       P-R-T Axes : -16 047 017 degrees     QTc Int : 390 ms    Normal sinus rhythm  Normal ECG    Confirmed by Kris Rodriguez MD ([id number]) on 10/15/2024 8:32:20 AM    Referred By:             Confirmed By:Kris Rodriguez MD       ASSESSMENT/PLAN:           Pre-op Assessment    I have reviewed the Patient Summary Reports.     I have reviewed the Nursing Notes. I have reviewed the NPO Status.   I have reviewed the Medications.     Review of Systems  Anesthesia Hx:    Severe ponv           Denies Family Hx of Anesthesia complications.   Personal Hx of Anesthesia complications, Post-Operative Nausea/Vomiting, with every anesthetic, despite treatment                    Social:  Non-Smoker       Hematology/Oncology:  Hematology Normal                     Current/Recent Cancer. (s/p mastectomy)  Breast    bilateral          EENT/Dental:  EENT/Dental Normal           Cardiovascular:     Hypertension, poorly controlled                                           Pulmonary:  Pulmonary Normal                       Renal/:  Renal/ Normal                 Hepatic/GI:  Hepatic/GI Normal                    Musculoskeletal:  Musculoskeletal Normal                Neurological:  Neurology Normal                                      Endocrine:  Endocrine Normal            Dermatological:  Skin Normal    Psych:  Psychiatric Normal                    Physical Exam  General: Cooperative, Alert and Oriented    Airway:  Mallampati: III / II  Mouth Opening: Normal  TM Distance: Normal  Tongue: Normal  Neck ROM: Normal ROM    Dental:  Intact        Anesthesia Plan  Type of Anesthesia, risks & benefits discussed:    Anesthesia Type: Gen ETT  Intra-op Monitoring Plan: Standard ASA Monitors  Post Op Pain Control Plan: multimodal analgesia and IV/PO Opioids PRN  Induction:  IV  Airway Plan: Video, Post-Induction  Informed Consent: Informed consent signed with the Patient and all parties understand the risks and agree with anesthesia plan.  All questions answered.   ASA Score: 2  Day of Surgery Review of History & Physical: H&P Update referred to the surgeon/provider.    Ready For Surgery From Anesthesia Perspective.     .

## 2025-02-14 NOTE — BRIEF OP NOTE
Certification of Assistant at Surgery       Surgery Date: 2/14/2025     Participating Surgeons:  Surgeons and Role:     * Roberto Lorenz, DO - Primary    Procedures:  Procedure(s) (LRB):  REPLACEMENT, IMPLANT, BREAST (Bilateral)  LIPOSUCTION, WITH FAT GRAFTING TO TRUNK, BREASTS, SCALP, ARMS, AND/OR LEGS, 50CC OR LESS INJECTABLE (Bilateral)    Assistant Surgeon's Certification of Necessity:  I understand that section 1842 (b) (6) (d) of the Social Security Act generally prohibits Medicare Part B reasonable charge payment for the services of assistants at surgery in teaching hospitals when qualified residents are available to furnish such services. I certify that the services for which payment is claimed were medically necessary, and that no qualified resident was available to perform the services. I further understand that these services are subject to post-payment review by the Medicare carrier.      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, estrogen receptor positive [C50.411, Z17.0, C50.412]    Post-op Diagnosis:  Post-Op Diagnosis Codes:     * 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, estrogen receptor positive [C50.411, Z17.0, C50.412]    Procedure(s) (LRB):  REPLACEMENT, IMPLANT, BREAST (Bilateral)  LIPOSUCTION, WITH FAT GRAFTING TO TRUNK, BREASTS, SCALP, ARMS, AND/OR LEGS, 50CC OR LESS INJECTABLE (Bilateral)    Anesthesia: General    Description of the findings of the procedure: bilateral tissue expander removal, placement of permanent breast implants, liposuction of the abdomen with fat grafting to the breasts    Findings/Key Components: See operative  report    Estimated Blood Loss: * No values recorded between 2/14/2025  7:54 AM and 2/14/2025  9:49 AM *         Specimens:   Specimen (24h ago, onward)       Start     Ordered    02/14/25 0844  Specimen to Pathology, Surgery Breast  Once        Comments: Pre-op Diagnosis: 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, estrogen receptor positive [C50.411, Z17.0, C50.412]Procedure(s):REPLACEMENT, IMPLANT, BREASTLIPOSUCTION, WITH FAT GRAFTING TO TRUNK, BREASTS, SCALP, ARMS, AND/OR LEGS, 50CC OR LESS INJECTABLE Number of specimens: 1Name of specimens: 1)  Bilateral breast expanders (GROSS ONLY)     References:    Click here for ordering Quick Tip   Question Answer Comment   Procedure Type: Breast    Specimen Class: Routine/Screening    Release to patient Immediate        02/14/25 0845                    Implants:   Implant Name Type Inv. Item Serial No.  Lot No. LRB No. Used Action   [employer] Memory[employer]el[employer] Breast implant Breast  [id number] [employer]  Left 1 Implanted   B[employer]ant MemoryGel BOOSTbreast implant Breast  [id number] [employer]BERTO  Right 1 Implanted       Complications: None    Discharge Note    SUMMARY     Admit Date: 2/14/2025    Discharge Date and Time:  02/14/2025 10:24 AM    Hospital Course: Patient was placed in observation status for the above procedure.  See above.  Postoperatively was discharged home from the PACU once criteria was met.    Final Diagnosis: Post-Op Diagnosis Codes:     * 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, estrogen receptor positive [C50.411, Z17.0, C50.412]    Disposition: Home or Self Care    Follow Up/Patient Instructions:     Medications:  Reconciled Home Medications:      Medication List        START taking these medications      methocarbamoL 500 MG Tab  Commonly known as: Robaxin  Take 1 tablet (500 mg total) by mouth 4 (four) times daily. for 10 days            CHANGE how you take these medications       acetaminophen 500 MG tablet  Commonly known as: TYLENOL  Take 2 tablets (1,000 mg total) by mouth every 8 (eight) hours. for 7 days  What changed: when to take this            CONTINUE taking these medications      atenoloL 50 MG tablet  Commonly known as: TENORMIN  75 mg.     citalopram 10 MG tablet  Commonly known as: CeleXA  Take 1 tablet (10 mg total) by mouth once daily.     diazePAM 2 MG tablet  Commonly known as: VALIUM  Take 1 tablet (2 mg total) by mouth every 8 (eight) hours as needed for Anxiety.     ezetimibe 10 mg tablet  Commonly known as: ZETIA  Take 10 mg by mouth.     gabapentin 300 MG capsule  Commonly known as: NEURONTIN  Take 1 capsule (300 mg total) by mouth 3 (three) times daily. for 7 days     * hydroCHLOROthiazide 25 MG tablet  Commonly known as: HYDRODIURIL  Take 1 tablet (25 mg total) by mouth once daily.     * hydroCHLOROthiazide 25 MG tablet  Commonly known as: HYDRODIURIL  Take 1 tablet (25 mg total) by mouth once daily.     ibuprofen 600 MG tablet  Commonly known as: ADVIL,MOTRIN  Take 1 tablet (600 mg total) by mouth 3 (three) times daily. for 7 days     oxyCODONE 5 MG immediate release tablet  Commonly known as: ROXICODONE  Take 1 tablet (5 mg total) by mouth every 4 (four) hours as needed for Pain.           * This list has 2 medication(s) that are the same as other medications prescribed for you. Read the directions carefully, and ask your doctor or other care provider to review them with you.                Discharge Procedure Orders   Notify your health care provider if you experience any of the following:  increased confusion or weakness     Notify your health care provider if you experience any of the following:  persistent dizziness, light-headedness, or visual disturbances     Notify your health care provider if you experience any of the following:  worsening rash     Notify your health care provider if you experience any of the following:  severe persistent headache      Notify your health care provider if you experience any of the following:  difficulty breathing or increased cough     Notify your health care provider if you experience any of the following:  redness, tenderness, or signs of infection (pain, swelling, redness, odor or green/yellow discharge around incision site)     Notify your health care provider if you experience any of the following:  severe uncontrolled pain     Notify your health care provider if you experience any of the following:  persistent nausea and vomiting or diarrhea     Notify your health care provider if you experience any of the following:  temperature >100.4     Leave dressing on - Keep it clean, dry, and intact until clinic visit

## 2025-02-14 NOTE — DISCHARGE INSTRUCTIONS
Plastic Surgery Discharge Instructions  Ramon Lorenz DO    Wound Care  1. Shower daily beginning 48 hrs after surgery  2. Okay to let warm soapy water run over the wound at that time  3. Do not submerge wounds in the bath  4. Leave any skin glue or mesh tape in place    Drain Care  If you have drains, strip tubing and record output when drain bulb becomes half full, or at least twice daily  Record output for each drain and bring to our follow up appointment    Diet  Regular Diet    Activity  Light activity only - no lifting greater than 10 lbs  Avoid strenuous activity that would cause you to get hot or sweaty    Medications  You have been given a prescription for narcotic pain medication, anti-inflammatory pain, muscle relaxer, and nerve pain  Unless otherwise contraindicated by your doctor, take 2 extra strength Tylenol and 600mg of ibuprofen every 8 hours  Please take medications as prescribed     What to call for:  1. Sustained fever > 101.0  2. Changes in color, sensation, temperature or pain at surgical site  3. Redness and/or drainage from the surgical site  4. Any reaction to prescribed medications  5. Continuous bloody drainage from surgical drains  6. Wound vac malfunction  7. Questions related to the procedure    Follow-up  1. Please call [phone] to schedule or confirm your follow up visit at the [facility], [address] on 2/18/2025  2. Call with any questions or concerns.  2. After hours call [phone] - ask to speak with the Plastic Surgery fellow on call

## 2025-02-17 LAB
FINAL PATHOLOGIC DIAGNOSIS: NORMAL
GROSS: NORMAL
Lab: NORMAL

## 2025-02-18 ENCOUNTER — OFFICE VISIT (OUTPATIENT)
Dept: PLASTIC SURGERY | Facility: CLINIC | Age: 60
End: 2025-02-18
Payer: COMMERCIAL

## 2025-02-18 VITALS — DIASTOLIC BLOOD PRESSURE: 87 MMHG | HEART RATE: 69 BPM | SYSTOLIC BLOOD PRESSURE: 162 MMHG

## 2025-02-18 DIAGNOSIS — Z09 SURGERY FOLLOW-UP: Primary | ICD-10-CM

## 2025-02-18 PROCEDURE — 99999 PR PBB SHADOW E&M-EST. PATIENT-LVL III: CPT | Mod: PBBFAC,,, | Performed by: SURGERY

## 2025-02-18 PROCEDURE — 99024 POSTOP FOLLOW-UP VISIT: CPT | Mod: S$GLB,,, | Performed by: SURGERY

## 2025-02-18 PROCEDURE — 3077F SYST BP >= 140 MM HG: CPT | Mod: CPTII,S$GLB,, | Performed by: SURGERY

## 2025-02-18 PROCEDURE — 3079F DIAST BP 80-89 MM HG: CPT | Mod: CPTII,S$GLB,, | Performed by: SURGERY

## 2025-02-18 RX ORDER — POLYETHYLENE GLYCOL 3350 17 G/17G
17 POWDER, FOR SOLUTION ORAL DAILY
COMMUNITY
End: 2025-03-11

## 2025-02-20 NOTE — PROGRESS NOTES
Coco Roger presents [facility] for a follow up visit status post bilateral tissue expander removal with placement of breast implants, Major revision of bilateral reconstructed breasts with excision of the excess lower pole skin, fat grafting on 2/14/25. She has 435 cc moderate high boost smooth round silicone implants. She is bruised from the fat grafting, as expected. She notes serosanguinous drainage from her abdominal lipo sites on the day of surgery which has since resolved.    PHYSICAL EXAMINATION  Bilateral breast fat grafted with improvement in contour. There is bruising, as expected.   Lipo site of abdomen is mildly bruised. Lipo site sutures with nylon sutures in place.  Drains are not present       ASSESSMENT/PLAN  Coco Roger is s/p bilateral implant exchange with fat grafting and skin revision of both breasts  - Lipo site sutures removed  - Continue compression for 4 weeks day and night and an additional 4 weeks during the day for best contour  - Follow up 1 month      All questions were answered. The patient was advised to contact the clinic with any questions or concerns prior to their next visit.       Alexandra Maurer PA-C  Plastic and Reconstructive Surgery

## 2025-03-11 ENCOUNTER — PATIENT MESSAGE (OUTPATIENT)
Dept: PLASTIC SURGERY | Facility: CLINIC | Age: 60
End: 2025-03-11

## 2025-03-11 ENCOUNTER — OFFICE VISIT (OUTPATIENT)
Dept: PLASTIC SURGERY | Facility: CLINIC | Age: 60
End: 2025-03-11
Payer: COMMERCIAL

## 2025-03-11 VITALS
WEIGHT: 150 LBS | SYSTOLIC BLOOD PRESSURE: 139 MMHG | HEART RATE: 66 BPM | DIASTOLIC BLOOD PRESSURE: 80 MMHG | BODY MASS INDEX: 25.61 KG/M2 | HEIGHT: 64 IN

## 2025-03-11 DIAGNOSIS — Z09 SURGERY FOLLOW-UP: Primary | ICD-10-CM

## 2025-03-11 PROCEDURE — 3079F DIAST BP 80-89 MM HG: CPT | Mod: CPTII,S$GLB,, | Performed by: SURGERY

## 2025-03-11 PROCEDURE — 99024 POSTOP FOLLOW-UP VISIT: CPT | Mod: S$GLB,,, | Performed by: SURGERY

## 2025-03-11 PROCEDURE — 3075F SYST BP GE 130 - 139MM HG: CPT | Mod: CPTII,S$GLB,, | Performed by: SURGERY

## 2025-03-11 PROCEDURE — 99999 PR PBB SHADOW E&M-EST. PATIENT-LVL III: CPT | Mod: PBBFAC,,, | Performed by: SURGERY

## 2025-03-13 NOTE — PROGRESS NOTES
Coco Roger presents [facility] for a follow up visit status post bilateral tissue expander removal with placement of breast implants, Major revision of bilateral reconstructed breasts with excision of the excess lower pole skin, fat grafting on 2/14/25. She has 435 cc moderate high boost smooth round silicone implants.  She started arimidex. She developed a pruritic rash that started on her back and spread to her trunk. She stopped the arimidex and is awaiting discussion with her oncologist.     PHYSICAL EXAMINATION  Fat grafting of bilateral breasts with excellent contour. Soft implants. Size and symmetry are good.  Lipo site of abdomen well healing.  There is an annular erythematous rash of her trunk with a larger distribution on her back. There is desquamation in the central clearance. No oral lesions.        ASSESSMENT/PLAN  Coco Roger is s/p bilateral implant exchange with fat grafting and skin revision of both breasts  - Rash consistent with pityriasis rosea - self limited. May use antihistamines for itching.  - Continue compression for 4 weeks day and night and an additional 4 weeks during the day for best contour  - Follow up 3 months         All questions were answered. The patient was advised to contact the clinic with any questions or concerns prior to their next visit.         Alexandra Maurer PA-C  Plastic and Reconstructive Surgery

## 2025-03-31 ENCOUNTER — LAB VISIT (OUTPATIENT)
Dept: LAB | Facility: HOSPITAL | Age: 60
End: 2025-03-31
Attending: INTERNAL MEDICINE
Payer: COMMERCIAL

## 2025-03-31 DIAGNOSIS — C50.412 MALIGNANT NEOPLASM OF UPPER-OUTER QUADRANT OF LEFT FEMALE BREAST: Primary | ICD-10-CM

## 2025-03-31 LAB
25(OH)D3+25(OH)D2 SERPL-MCNC: 20 NG/ML (ref 30–96)
ABSOLUTE NEUTROPHIL MANUAL (OHS): 4.6 K/UL
ALBUMIN SERPL BCP-MCNC: 3.6 G/DL (ref 3.5–5.2)
ALP SERPL-CCNC: 50 UNIT/L (ref 40–150)
ALT SERPL W/O P-5'-P-CCNC: 28 UNIT/L (ref 10–44)
ANION GAP (OHS): 9 MMOL/L (ref 8–16)
AST SERPL-CCNC: 20 UNIT/L (ref 11–45)
BILIRUB SERPL-MCNC: 0.4 MG/DL (ref 0.1–1)
BUN SERPL-MCNC: 21 MG/DL (ref 6–20)
CALCIUM SERPL-MCNC: 8.9 MG/DL (ref 8.7–10.5)
CHLORIDE SERPL-SCNC: 102 MMOL/L (ref 95–110)
CO2 SERPL-SCNC: 31 MMOL/L (ref 23–29)
CREAT SERPL-MCNC: 1 MG/DL (ref 0.5–1.4)
ERYTHROCYTE [DISTWIDTH] IN BLOOD BY AUTOMATED COUNT: 13.6 % (ref 11.5–14.5)
GFR SERPLBLD CREATININE-BSD FMLA CKD-EPI: >60 ML/MIN/1.73/M2
GLUCOSE SERPL-MCNC: 90 MG/DL (ref 70–110)
HCT VFR BLD AUTO: 41.9 % (ref 37–48.5)
HGB BLD-MCNC: 13.7 GM/DL (ref 12–16)
LYMPHOCYTES NFR BLD MANUAL: 84 % (ref 18–48)
MCH RBC QN AUTO: 30.5 PG (ref 27–31)
MCHC RBC AUTO-ENTMCNC: 32.7 G/DL (ref 32–36)
MCV RBC AUTO: 93 FL (ref 82–98)
MONOCYTES NFR BLD MANUAL: 1 % (ref 4–15)
NEUTROPHILS NFR BLD MANUAL: 15 % (ref 38–73)
NUCLEATED RBC (/100WBC) (OHS): 0 /100 WBC
PLATELET # BLD AUTO: 336 K/UL (ref 150–450)
PMV BLD AUTO: 9.9 FL (ref 9.2–12.9)
POTASSIUM SERPL-SCNC: 3.1 MMOL/L (ref 3.5–5.1)
PROT SERPL-MCNC: 6.7 GM/DL (ref 6–8.4)
RBC # BLD AUTO: 4.49 M/UL (ref 4–5.4)
SODIUM SERPL-SCNC: 142 MMOL/L (ref 136–145)
T4 FREE SERPL-MCNC: NORMAL NG/DL
TSH SERPL-ACNC: 2.54 UIU/ML (ref 0.4–4)
WBC # BLD AUTO: 30.53 K/UL (ref 3.9–12.7)

## 2025-03-31 PROCEDURE — 88275 CYTOGENETICS 100-300: CPT

## 2025-03-31 PROCEDURE — 84443 ASSAY THYROID STIM HORMONE: CPT

## 2025-03-31 PROCEDURE — 36415 COLL VENOUS BLD VENIPUNCTURE: CPT

## 2025-03-31 PROCEDURE — 82306 VITAMIN D 25 HYDROXY: CPT

## 2025-03-31 PROCEDURE — 85025 COMPLETE CBC W/AUTO DIFF WBC: CPT

## 2025-03-31 PROCEDURE — 82040 ASSAY OF SERUM ALBUMIN: CPT

## 2025-03-31 PROCEDURE — 80061 LIPID PANEL: CPT

## 2025-03-31 PROCEDURE — 85060 BLOOD SMEAR INTERPRETATION: CPT | Mod: ,,, | Performed by: PATHOLOGY

## 2025-04-01 LAB — PATHOLOGIST REVIEW - CBC/DIFF (OHS): NORMAL

## 2025-04-02 LAB
CHOLEST SERPL-MCNC: 198 MG/DL (ref 120–199)
CHOLEST/HDLC SERPL: 2.8 {RATIO} (ref 2–5)
HDLC SERPL-MCNC: 71 MG/DL (ref 40–75)
HDLC SERPL: 35.9 % (ref 20–50)
LDLC SERPL CALC-MCNC: 106.2 MG/DL (ref 63–159)
NONHDLC SERPL-MCNC: 127 MG/DL
TRIGL SERPL-MCNC: 104 MG/DL (ref 30–150)

## 2025-04-03 LAB
CELLS W CYTOGENETIC ABNL BLD/T: NORMAL
CHROM ANALY RESULT (ISCN): NORMAL
CLINICAL CYTOGENETICIST REVIEW: NORMAL
LAB TEST METHOD: NORMAL
M REASON FOR REFERRAL: NORMAL
MOL DX INTERP BLD/T QL: NORMAL
PROVIDER SIGNING NAME: NORMAL
SPECIMEN SOURCE: NORMAL
TEST PERFORMANCE INFO SPEC: NORMAL

## 2025-04-28 ENCOUNTER — LAB VISIT (OUTPATIENT)
Dept: LAB | Facility: HOSPITAL | Age: 60
End: 2025-04-28
Attending: INTERNAL MEDICINE
Payer: COMMERCIAL

## 2025-04-28 DIAGNOSIS — C50.412 MALIGNANT NEOPLASM OF UPPER-OUTER QUADRANT OF LEFT FEMALE BREAST: Primary | ICD-10-CM

## 2025-04-28 DIAGNOSIS — C91.10 LEUKEMIA, LYMPHOCYTIC, CHRONIC: ICD-10-CM

## 2025-04-28 DIAGNOSIS — C50.911 MALIGNANT NEOPLASM OF RIGHT BREAST: ICD-10-CM

## 2025-04-28 LAB
ANION GAP (OHS): 7 MMOL/L (ref 8–16)
BUN SERPL-MCNC: 18 MG/DL (ref 6–20)
CALCIUM SERPL-MCNC: 9.4 MG/DL (ref 8.7–10.5)
CHLORIDE SERPL-SCNC: 105 MMOL/L (ref 95–110)
CO2 SERPL-SCNC: 30 MMOL/L (ref 23–29)
CREAT SERPL-MCNC: 1 MG/DL (ref 0.5–1.4)
GFR SERPLBLD CREATININE-BSD FMLA CKD-EPI: >60 ML/MIN/1.73/M2
GLUCOSE SERPL-MCNC: 101 MG/DL (ref 70–110)
POTASSIUM SERPL-SCNC: 3.8 MMOL/L (ref 3.5–5.1)
SODIUM SERPL-SCNC: 142 MMOL/L (ref 136–145)

## 2025-04-28 PROCEDURE — 82374 ASSAY BLOOD CARBON DIOXIDE: CPT

## 2025-04-28 PROCEDURE — 36415 COLL VENOUS BLD VENIPUNCTURE: CPT

## 2025-06-24 ENCOUNTER — LAB VISIT (OUTPATIENT)
Dept: LAB | Facility: HOSPITAL | Age: 60
End: 2025-06-24
Attending: CLINIC/CENTER
Payer: COMMERCIAL

## 2025-06-24 DIAGNOSIS — C91.10 CLL (CHRONIC LYMPHOCYTIC LEUKEMIA): ICD-10-CM

## 2025-06-24 DIAGNOSIS — D72.820 LYMPHOCYTOSIS: ICD-10-CM

## 2025-06-24 DIAGNOSIS — C50.412 MALIGNANT NEOPLASM OF UPPER-OUTER QUADRANT OF BOTH BREASTS IN FEMALE, ESTROGEN RECEPTOR POSITIVE: ICD-10-CM

## 2025-06-24 DIAGNOSIS — Z17.0 MALIGNANT NEOPLASM OF UPPER-OUTER QUADRANT OF BOTH BREASTS IN FEMALE, ESTROGEN RECEPTOR POSITIVE: ICD-10-CM

## 2025-06-24 DIAGNOSIS — Z01.818 PRE-OP TESTING: ICD-10-CM

## 2025-06-24 DIAGNOSIS — C50.411 MALIGNANT NEOPLASM OF UPPER-OUTER QUADRANT OF BOTH BREASTS IN FEMALE, ESTROGEN RECEPTOR POSITIVE: ICD-10-CM

## 2025-06-24 DIAGNOSIS — C50.412 MALIGNANT NEOPLASM OF UPPER-OUTER QUADRANT OF LEFT FEMALE BREAST: Primary | ICD-10-CM

## 2025-06-24 DIAGNOSIS — C50.911 MALIGNANT NEOPLASM OF RIGHT BREAST: ICD-10-CM

## 2025-06-24 DIAGNOSIS — C91.10 LEUKEMIA, LYMPHOCYTIC, CHRONIC: ICD-10-CM

## 2025-06-24 LAB
25(OH)D3+25(OH)D2 SERPL-MCNC: 36 NG/ML (ref 30–96)
ABSOLUTE NEUTROPHIL MANUAL (OHS): 2.7 K/UL
ALBUMIN SERPL BCP-MCNC: 3.7 G/DL (ref 3.5–5.2)
ALP SERPL-CCNC: 50 UNIT/L (ref 40–150)
ALT SERPL W/O P-5'-P-CCNC: 17 UNIT/L (ref 10–44)
ANION GAP (OHS): 9 MMOL/L (ref 8–16)
AST SERPL-CCNC: 20 UNIT/L (ref 11–45)
BILIRUB SERPL-MCNC: 0.5 MG/DL (ref 0.1–1)
BUN SERPL-MCNC: 17 MG/DL (ref 6–20)
CALCIUM SERPL-MCNC: 9.8 MG/DL (ref 8.7–10.5)
CHLORIDE SERPL-SCNC: 107 MMOL/L (ref 95–110)
CHOLEST SERPL-MCNC: 184 MG/DL (ref 120–199)
CHOLEST/HDLC SERPL: 3.5 {RATIO} (ref 2–5)
CO2 SERPL-SCNC: 27 MMOL/L (ref 23–29)
CREAT SERPL-MCNC: 1 MG/DL (ref 0.5–1.4)
DACRYOCYTES BLD QL SMEAR: ABNORMAL
ERYTHROCYTE [DISTWIDTH] IN BLOOD BY AUTOMATED COUNT: 13.3 % (ref 11.5–14.5)
GFR SERPLBLD CREATININE-BSD FMLA CKD-EPI: >60 ML/MIN/1.73/M2
GLUCOSE SERPL-MCNC: 104 MG/DL (ref 70–110)
HCT VFR BLD AUTO: 42.3 % (ref 37–48.5)
HDLC SERPL-MCNC: 53 MG/DL (ref 40–75)
HDLC SERPL: 28.8 % (ref 20–50)
HGB BLD-MCNC: 14.3 GM/DL (ref 12–16)
LDLC SERPL CALC-MCNC: 119.2 MG/DL (ref 63–159)
LYMPHOCYTES NFR BLD MANUAL: 83 % (ref 18–48)
MCH RBC QN AUTO: 30.4 PG (ref 27–31)
MCHC RBC AUTO-ENTMCNC: 33.8 G/DL (ref 32–36)
MCV RBC AUTO: 90 FL (ref 82–98)
MONOCYTES NFR BLD MANUAL: 1 % (ref 4–15)
NEUTROPHILS NFR BLD MANUAL: 16 % (ref 38–73)
NONHDLC SERPL-MCNC: 131 MG/DL
NUCLEATED RBC (/100WBC) (OHS): 0 /100 WBC
PLATELET # BLD AUTO: 255 K/UL (ref 150–450)
PLATELET BLD QL SMEAR: ABNORMAL
PMV BLD AUTO: 10.4 FL (ref 9.2–12.9)
POIKILOCYTOSIS BLD QL SMEAR: SLIGHT
POTASSIUM SERPL-SCNC: 3.5 MMOL/L (ref 3.5–5.1)
PROT SERPL-MCNC: 7.3 GM/DL (ref 6–8.4)
RBC # BLD AUTO: 4.71 M/UL (ref 4–5.4)
SODIUM SERPL-SCNC: 143 MMOL/L (ref 136–145)
TRIGL SERPL-MCNC: 59 MG/DL (ref 30–150)
WBC # BLD AUTO: 16.79 K/UL (ref 3.9–12.7)

## 2025-06-24 PROCEDURE — 36415 COLL VENOUS BLD VENIPUNCTURE: CPT

## 2025-06-24 PROCEDURE — 85027 COMPLETE CBC AUTOMATED: CPT

## 2025-06-24 PROCEDURE — 82306 VITAMIN D 25 HYDROXY: CPT

## 2025-06-24 PROCEDURE — 84450 TRANSFERASE (AST) (SGOT): CPT

## 2025-06-24 PROCEDURE — 80061 LIPID PANEL: CPT

## 2025-07-07 ENCOUNTER — LAB VISIT (OUTPATIENT)
Dept: LAB | Facility: HOSPITAL | Age: 60
End: 2025-07-07
Attending: CLINIC/CENTER
Payer: COMMERCIAL

## 2025-07-07 DIAGNOSIS — C50.412 MALIGNANT NEOPLASM OF UPPER-OUTER QUADRANT OF LEFT FEMALE BREAST: Primary | ICD-10-CM

## 2025-07-07 DIAGNOSIS — C50.911 MALIGNANT NEOPLASM OF RIGHT BREAST: ICD-10-CM

## 2025-07-07 LAB
ABSOLUTE NEUTROPHIL MANUAL (OHS): 4.2 K/UL
ALBUMIN SERPL BCP-MCNC: 3.6 G/DL (ref 3.5–5.2)
ALP SERPL-CCNC: 52 UNIT/L (ref 40–150)
ALT SERPL W/O P-5'-P-CCNC: 23 UNIT/L (ref 10–44)
ANION GAP (OHS): 9 MMOL/L (ref 8–16)
ANISOCYTOSIS BLD QL SMEAR: SLIGHT
AST SERPL-CCNC: 22 UNIT/L (ref 11–45)
BILIRUB SERPL-MCNC: 0.8 MG/DL (ref 0.1–1)
BUN SERPL-MCNC: 17 MG/DL (ref 6–20)
CALCIUM SERPL-MCNC: 9.5 MG/DL (ref 8.7–10.5)
CHLORIDE SERPL-SCNC: 105 MMOL/L (ref 95–110)
CO2 SERPL-SCNC: 26 MMOL/L (ref 23–29)
CREAT SERPL-MCNC: 1.3 MG/DL (ref 0.5–1.4)
DACRYOCYTES BLD QL SMEAR: ABNORMAL
EOSINOPHIL NFR BLD MANUAL: 1 % (ref 0–8)
ERYTHROCYTE [DISTWIDTH] IN BLOOD BY AUTOMATED COUNT: 13.3 % (ref 11.5–14.5)
GFR SERPLBLD CREATININE-BSD FMLA CKD-EPI: 47 ML/MIN/1.73/M2
GLUCOSE SERPL-MCNC: 105 MG/DL (ref 70–110)
HCT VFR BLD AUTO: 39.3 % (ref 37–48.5)
HGB BLD-MCNC: 13 GM/DL (ref 12–16)
HYPOCHROMIA BLD QL SMEAR: ABNORMAL
LYMPHOCYTES NFR BLD MANUAL: 71 % (ref 18–48)
MAGNESIUM SERPL-MCNC: 1.8 MG/DL (ref 1.6–2.6)
MCH RBC QN AUTO: 29.7 PG (ref 27–31)
MCHC RBC AUTO-ENTMCNC: 33.1 G/DL (ref 32–36)
MCV RBC AUTO: 90 FL (ref 82–98)
NEUTROPHILS NFR BLD MANUAL: 28 % (ref 38–73)
NUCLEATED RBC (/100WBC) (OHS): 0 /100 WBC
PLATELET # BLD AUTO: 219 K/UL (ref 150–450)
PMV BLD AUTO: 10.9 FL (ref 9.2–12.9)
POIKILOCYTOSIS BLD QL SMEAR: SLIGHT
POTASSIUM SERPL-SCNC: 4.1 MMOL/L (ref 3.5–5.1)
PROT SERPL-MCNC: 6.9 GM/DL (ref 6–8.4)
RBC # BLD AUTO: 4.37 M/UL (ref 4–5.4)
SODIUM SERPL-SCNC: 140 MMOL/L (ref 136–145)
WBC # BLD AUTO: 15.08 K/UL (ref 3.9–12.7)

## 2025-07-07 PROCEDURE — 84075 ASSAY ALKALINE PHOSPHATASE: CPT

## 2025-07-07 PROCEDURE — 83735 ASSAY OF MAGNESIUM: CPT

## 2025-07-07 PROCEDURE — 85027 COMPLETE CBC AUTOMATED: CPT

## 2025-07-07 PROCEDURE — 36415 COLL VENOUS BLD VENIPUNCTURE: CPT

## 2025-07-15 ENCOUNTER — OFFICE VISIT (OUTPATIENT)
Dept: PLASTIC SURGERY | Facility: CLINIC | Age: 60
End: 2025-07-15
Payer: COMMERCIAL

## 2025-07-15 VITALS
HEART RATE: 58 BPM | WEIGHT: 155.19 LBS | SYSTOLIC BLOOD PRESSURE: 137 MMHG | BODY MASS INDEX: 26.49 KG/M2 | HEIGHT: 64 IN | DIASTOLIC BLOOD PRESSURE: 83 MMHG

## 2025-07-15 DIAGNOSIS — Z98.890 HISTORY OF BREAST RECONSTRUCTION: ICD-10-CM

## 2025-07-15 DIAGNOSIS — Z17.0 MALIGNANT NEOPLASM OF UPPER-OUTER QUADRANT OF BOTH BREASTS IN FEMALE, ESTROGEN RECEPTOR POSITIVE: Primary | ICD-10-CM

## 2025-07-15 DIAGNOSIS — C50.412 MALIGNANT NEOPLASM OF UPPER-OUTER QUADRANT OF BOTH BREASTS IN FEMALE, ESTROGEN RECEPTOR POSITIVE: Primary | ICD-10-CM

## 2025-07-15 DIAGNOSIS — C50.411 MALIGNANT NEOPLASM OF UPPER-OUTER QUADRANT OF BOTH BREASTS IN FEMALE, ESTROGEN RECEPTOR POSITIVE: Primary | ICD-10-CM

## 2025-07-15 PROCEDURE — 3008F BODY MASS INDEX DOCD: CPT | Mod: CPTII,S$GLB,, | Performed by: SURGERY

## 2025-07-15 PROCEDURE — 3079F DIAST BP 80-89 MM HG: CPT | Mod: CPTII,S$GLB,, | Performed by: SURGERY

## 2025-07-15 PROCEDURE — 1159F MED LIST DOCD IN RCRD: CPT | Mod: CPTII,S$GLB,, | Performed by: SURGERY

## 2025-07-15 PROCEDURE — 3075F SYST BP GE 130 - 139MM HG: CPT | Mod: CPTII,S$GLB,, | Performed by: SURGERY

## 2025-07-15 PROCEDURE — 99213 OFFICE O/P EST LOW 20 MIN: CPT | Mod: S$GLB,,, | Performed by: SURGERY

## 2025-07-15 PROCEDURE — 99999 PR PBB SHADOW E&M-EST. PATIENT-LVL IV: CPT | Mod: PBBFAC,,, | Performed by: SURGERY

## 2025-07-15 RX ORDER — LETROZOLE 2.5 MG/1
2.5 TABLET, FILM COATED ORAL
COMMUNITY

## 2025-07-15 RX ORDER — RIBOCICLIB 200 MG/1
TABLET, FILM COATED ORAL
COMMUNITY

## 2025-07-21 ENCOUNTER — LAB VISIT (OUTPATIENT)
Dept: LAB | Facility: HOSPITAL | Age: 60
End: 2025-07-21
Attending: CLINIC/CENTER
Payer: COMMERCIAL

## 2025-07-21 DIAGNOSIS — C50.911 MALIGNANT NEOPLASM OF RIGHT BREAST: ICD-10-CM

## 2025-07-21 DIAGNOSIS — C91.10 LEUKEMIA, LYMPHOCYTIC, CHRONIC: ICD-10-CM

## 2025-07-21 DIAGNOSIS — C50.412 MALIGNANT NEOPLASM OF UPPER-OUTER QUADRANT OF LEFT FEMALE BREAST: Primary | ICD-10-CM

## 2025-07-21 LAB
ABSOLUTE NEUTROPHIL MANUAL (OHS): 1.1 K/UL
ALBUMIN SERPL BCP-MCNC: 3.6 G/DL (ref 3.5–5.2)
ALP SERPL-CCNC: 61 UNIT/L (ref 40–150)
ALT SERPL W/O P-5'-P-CCNC: 25 UNIT/L (ref 10–44)
ANION GAP (OHS): 8 MMOL/L (ref 8–16)
ANISOCYTOSIS BLD QL SMEAR: SLIGHT
AST SERPL-CCNC: 18 UNIT/L (ref 11–45)
BASOPHILS NFR BLD MANUAL: 1 %
BILIRUB SERPL-MCNC: 0.9 MG/DL (ref 0.1–1)
BUN SERPL-MCNC: 16 MG/DL (ref 6–20)
CALCIUM SERPL-MCNC: 9.5 MG/DL (ref 8.7–10.5)
CHLORIDE SERPL-SCNC: 105 MMOL/L (ref 95–110)
CO2 SERPL-SCNC: 29 MMOL/L (ref 23–29)
CREAT SERPL-MCNC: 1.4 MG/DL (ref 0.5–1.4)
ERYTHROCYTE [DISTWIDTH] IN BLOOD BY AUTOMATED COUNT: 14.4 % (ref 11.5–14.5)
GFR SERPLBLD CREATININE-BSD FMLA CKD-EPI: 43 ML/MIN/1.73/M2
GLUCOSE SERPL-MCNC: 99 MG/DL (ref 70–110)
HCT VFR BLD AUTO: 36.6 % (ref 37–48.5)
HGB BLD-MCNC: 12.4 GM/DL (ref 12–16)
LYMPHOCYTES NFR BLD MANUAL: 87 % (ref 18–48)
MAGNESIUM SERPL-MCNC: 1.9 MG/DL (ref 1.6–2.6)
MCH RBC QN AUTO: 31.2 PG (ref 27–31)
MCHC RBC AUTO-ENTMCNC: 33.9 G/DL (ref 32–36)
MCV RBC AUTO: 92 FL (ref 82–98)
MONOCYTES NFR BLD MANUAL: 1 % (ref 4–15)
NEUTROPHILS NFR BLD MANUAL: 11 % (ref 38–73)
NUCLEATED RBC (/100WBC) (OHS): 0 /100 WBC
PLATELET # BLD AUTO: 144 K/UL (ref 150–450)
PLATELET BLD QL SMEAR: ABNORMAL
PMV BLD AUTO: 10.3 FL (ref 9.2–12.9)
POIKILOCYTOSIS BLD QL SMEAR: SLIGHT
POTASSIUM SERPL-SCNC: 3.9 MMOL/L (ref 3.5–5.1)
PROT SERPL-MCNC: 7.1 GM/DL (ref 6–8.4)
RBC # BLD AUTO: 3.97 M/UL (ref 4–5.4)
SODIUM SERPL-SCNC: 142 MMOL/L (ref 136–145)
TARGETS BLD QL SMEAR: ABNORMAL
WBC # BLD AUTO: 9.73 K/UL (ref 3.9–12.7)

## 2025-07-21 PROCEDURE — 83735 ASSAY OF MAGNESIUM: CPT

## 2025-07-21 PROCEDURE — 82040 ASSAY OF SERUM ALBUMIN: CPT

## 2025-07-21 PROCEDURE — 82610 CYSTATIN C: CPT

## 2025-07-21 PROCEDURE — 85007 BL SMEAR W/DIFF WBC COUNT: CPT

## 2025-07-21 PROCEDURE — 36415 COLL VENOUS BLD VENIPUNCTURE: CPT

## 2025-07-22 LAB
CYSTATIN C SERPL-MCNC: 1.03 MG/L (ref 0.67–1.21)
GFR/BSA.PRED SERPLBLD CYS-BASED-ARV: 70 ML/MIN/BSA

## 2025-08-11 ENCOUNTER — LAB VISIT (OUTPATIENT)
Dept: LAB | Facility: HOSPITAL | Age: 60
End: 2025-08-11
Attending: CLINIC/CENTER
Payer: COMMERCIAL

## 2025-08-11 DIAGNOSIS — C50.412 MALIGNANT NEOPLASM OF UPPER-OUTER QUADRANT OF LEFT FEMALE BREAST: Primary | ICD-10-CM

## 2025-08-11 LAB
ABSOLUTE NEUTROPHIL MANUAL (OHS): 1.5 K/UL (ref 1.8–7.7)
ALBUMIN SERPL BCP-MCNC: 3.6 G/DL (ref 3.5–5.2)
ALP SERPL-CCNC: 151 UNIT/L (ref 40–150)
ALT SERPL W/O P-5'-P-CCNC: 592 UNIT/L (ref 10–44)
ANION GAP (OHS): 8 MMOL/L (ref 8–16)
ANISOCYTOSIS BLD QL SMEAR: ABNORMAL
ANISOCYTOSIS BLD QL SMEAR: NORMAL
AST SERPL-CCNC: 375 UNIT/L (ref 11–45)
BASOPHILS NFR BLD MANUAL: 1 %
BILIRUB SERPL-MCNC: 1 MG/DL (ref 0.1–1)
BUN SERPL-MCNC: 17 MG/DL (ref 6–20)
CALCIUM SERPL-MCNC: 10 MG/DL (ref 8.7–10.5)
CHLORIDE SERPL-SCNC: 105 MMOL/L (ref 95–110)
CO2 SERPL-SCNC: 31 MMOL/L (ref 23–29)
CREAT SERPL-MCNC: 1.4 MG/DL (ref 0.5–1.4)
EOSINOPHIL NFR BLD MANUAL: 2 % (ref 0–8)
ERYTHROCYTE [DISTWIDTH] IN BLOOD BY AUTOMATED COUNT: 16.7 % (ref 11.5–14.5)
GFR SERPLBLD CREATININE-BSD FMLA CKD-EPI: 43 ML/MIN/1.73/M2
GLUCOSE SERPL-MCNC: 111 MG/DL (ref 70–110)
HCT VFR BLD AUTO: 38.1 % (ref 37–48.5)
HGB BLD-MCNC: 13 GM/DL (ref 12–16)
LYMPHOCYTES NFR BLD MANUAL: 70 % (ref 18–48)
MAGNESIUM SERPL-MCNC: 1.7 MG/DL (ref 1.6–2.6)
MCH RBC QN AUTO: 31.9 PG (ref 27–31)
MCHC RBC AUTO-ENTMCNC: 34.1 G/DL (ref 32–36)
MCV RBC AUTO: 93 FL (ref 82–98)
MONOCYTES NFR BLD MANUAL: 7 % (ref 4–15)
NEUTROPHILS NFR BLD MANUAL: 19 % (ref 38–73)
NEUTS BAND NFR BLD MANUAL: 1 %
NUCLEATED RBC (/100WBC) (OHS): 0 /100 WBC
PLATELET # BLD AUTO: 340 K/UL (ref 150–450)
PMV BLD AUTO: 9.3 FL (ref 9.2–12.9)
POIKILOCYTOSIS BLD QL SMEAR: SLIGHT
POIKILOCYTOSIS BLD QL SMEAR: SLIGHT
POTASSIUM SERPL-SCNC: 3.8 MMOL/L (ref 3.5–5.1)
PROT SERPL-MCNC: 7.3 GM/DL (ref 6–8.4)
RBC # BLD AUTO: 4.08 M/UL (ref 4–5.4)
SODIUM SERPL-SCNC: 144 MMOL/L (ref 136–145)
WBC # BLD AUTO: 7.49 K/UL (ref 3.9–12.7)

## 2025-08-11 PROCEDURE — 85007 BL SMEAR W/DIFF WBC COUNT: CPT

## 2025-08-11 PROCEDURE — 83735 ASSAY OF MAGNESIUM: CPT

## 2025-08-11 PROCEDURE — 36415 COLL VENOUS BLD VENIPUNCTURE: CPT

## 2025-08-11 PROCEDURE — 82040 ASSAY OF SERUM ALBUMIN: CPT

## 2025-08-18 ENCOUNTER — LAB VISIT (OUTPATIENT)
Dept: LAB | Facility: HOSPITAL | Age: 60
End: 2025-08-18
Attending: INTERNAL MEDICINE
Payer: COMMERCIAL

## 2025-08-18 DIAGNOSIS — C50.411 MALIGNANT NEOPLASM OF UPPER-OUTER QUADRANT OF BOTH BREASTS IN FEMALE, ESTROGEN RECEPTOR POSITIVE: ICD-10-CM

## 2025-08-18 DIAGNOSIS — C50.412 MALIGNANT NEOPLASM OF UPPER-OUTER QUADRANT OF LEFT FEMALE BREAST: ICD-10-CM

## 2025-08-18 DIAGNOSIS — Z17.0 MALIGNANT NEOPLASM OF UPPER-OUTER QUADRANT OF BOTH BREASTS IN FEMALE, ESTROGEN RECEPTOR POSITIVE: ICD-10-CM

## 2025-08-18 DIAGNOSIS — C50.412 MALIGNANT NEOPLASM OF UPPER-OUTER QUADRANT OF BOTH BREASTS IN FEMALE, ESTROGEN RECEPTOR POSITIVE: ICD-10-CM

## 2025-08-18 DIAGNOSIS — C50.911 MALIGNANT NEOPLASM OF RIGHT BREAST: Primary | ICD-10-CM

## 2025-08-18 DIAGNOSIS — C91.10 CLL (CHRONIC LYMPHOCYTIC LEUKEMIA): ICD-10-CM

## 2025-08-18 LAB
ABSOLUTE EOSINOPHIL (OHS): 0.25 K/UL
ABSOLUTE MONOCYTE (OHS): 0.45 K/UL (ref 0.3–1)
ABSOLUTE NEUTROPHIL COUNT (OHS): 1.38 K/UL (ref 1.8–7.7)
ALBUMIN SERPL BCP-MCNC: 3.5 G/DL (ref 3.5–5.2)
ALP SERPL-CCNC: 111 UNIT/L (ref 40–150)
ALT SERPL W/O P-5'-P-CCNC: 588 UNIT/L (ref 10–44)
ANION GAP (OHS): 9 MMOL/L (ref 8–16)
AST SERPL-CCNC: 414 UNIT/L (ref 11–45)
BASOPHILS # BLD AUTO: 0.06 K/UL
BASOPHILS NFR BLD AUTO: 0.7 %
BILIRUB SERPL-MCNC: 0.5 MG/DL (ref 0.1–1)
BUN SERPL-MCNC: 12 MG/DL (ref 6–20)
CALCIUM SERPL-MCNC: 9.3 MG/DL (ref 8.7–10.5)
CHLORIDE SERPL-SCNC: 102 MMOL/L (ref 95–110)
CO2 SERPL-SCNC: 31 MMOL/L (ref 23–29)
CREAT SERPL-MCNC: 1.2 MG/DL (ref 0.5–1.4)
ERYTHROCYTE [DISTWIDTH] IN BLOOD BY AUTOMATED COUNT: 16.8 % (ref 11.5–14.5)
GFR SERPLBLD CREATININE-BSD FMLA CKD-EPI: 52 ML/MIN/1.73/M2
GLUCOSE SERPL-MCNC: 99 MG/DL (ref 70–110)
HCT VFR BLD AUTO: 37.8 % (ref 37–48.5)
HGB BLD-MCNC: 12.7 GM/DL (ref 12–16)
IMM GRANULOCYTES # BLD AUTO: 0.02 K/UL (ref 0–0.04)
IMM GRANULOCYTES NFR BLD AUTO: 0.2 % (ref 0–0.5)
LYMPHOCYTES # BLD AUTO: 6.09 K/UL (ref 1–4.8)
MAGNESIUM SERPL-MCNC: 1.9 MG/DL (ref 1.6–2.6)
MCH RBC QN AUTO: 31.1 PG (ref 27–31)
MCHC RBC AUTO-ENTMCNC: 33.6 G/DL (ref 32–36)
MCV RBC AUTO: 93 FL (ref 82–98)
NUCLEATED RBC (/100WBC) (OHS): 0 /100 WBC
PLATELET # BLD AUTO: 203 K/UL (ref 150–450)
PLATELET BLD QL SMEAR: NORMAL
PMV BLD AUTO: 9.6 FL (ref 9.2–12.9)
POTASSIUM SERPL-SCNC: 3.6 MMOL/L (ref 3.5–5.1)
PROT SERPL-MCNC: 6.9 GM/DL (ref 6–8.4)
RBC # BLD AUTO: 4.08 M/UL (ref 4–5.4)
RELATIVE EOSINOPHIL (OHS): 3 %
RELATIVE LYMPHOCYTE (OHS): 73.8 % (ref 18–48)
RELATIVE MONOCYTE (OHS): 5.5 % (ref 4–15)
RELATIVE NEUTROPHIL (OHS): 16.8 % (ref 38–73)
SODIUM SERPL-SCNC: 142 MMOL/L (ref 136–145)
WBC # BLD AUTO: 8.25 K/UL (ref 3.9–12.7)

## 2025-08-18 PROCEDURE — 85025 COMPLETE CBC W/AUTO DIFF WBC: CPT

## 2025-08-18 PROCEDURE — 82247 BILIRUBIN TOTAL: CPT

## 2025-08-18 PROCEDURE — 83735 ASSAY OF MAGNESIUM: CPT

## 2025-08-18 PROCEDURE — 36415 COLL VENOUS BLD VENIPUNCTURE: CPT

## 2025-08-25 ENCOUNTER — LAB VISIT (OUTPATIENT)
Dept: LAB | Facility: HOSPITAL | Age: 60
End: 2025-08-25
Attending: CLINIC/CENTER
Payer: COMMERCIAL

## 2025-08-25 DIAGNOSIS — C91.10 LEUKEMIA, LYMPHOCYTIC, CHRONIC: ICD-10-CM

## 2025-08-25 DIAGNOSIS — C50.412 MALIGNANT NEOPLASM OF UPPER-OUTER QUADRANT OF LEFT FEMALE BREAST: Primary | ICD-10-CM

## 2025-08-25 DIAGNOSIS — C50.911 MALIGNANT NEOPLASM OF RIGHT BREAST: ICD-10-CM

## 2025-08-25 LAB
ABSOLUTE NEUTROPHIL MANUAL (OHS): 2.8 K/UL (ref 1.8–7.7)
ALBUMIN SERPL BCP-MCNC: 3.6 G/DL (ref 3.5–5.2)
ALP SERPL-CCNC: 110 UNIT/L (ref 40–150)
ALT SERPL W/O P-5'-P-CCNC: 662 UNIT/L (ref 10–44)
ANION GAP (OHS): 11 MMOL/L (ref 8–16)
AST SERPL-CCNC: 512 UNIT/L (ref 11–45)
BILIRUB SERPL-MCNC: 0.8 MG/DL (ref 0.1–1)
BUN SERPL-MCNC: 14 MG/DL (ref 6–20)
CALCIUM SERPL-MCNC: 9.7 MG/DL (ref 8.7–10.5)
CHLORIDE SERPL-SCNC: 103 MMOL/L (ref 95–110)
CO2 SERPL-SCNC: 29 MMOL/L (ref 23–29)
CREAT SERPL-MCNC: 1.2 MG/DL (ref 0.5–1.4)
EOSINOPHIL NFR BLD MANUAL: 3 % (ref 0–8)
ERYTHROCYTE [DISTWIDTH] IN BLOOD BY AUTOMATED COUNT: 17.6 % (ref 11.5–14.5)
GFR SERPLBLD CREATININE-BSD FMLA CKD-EPI: 52 ML/MIN/1.73/M2
GLUCOSE SERPL-MCNC: 111 MG/DL (ref 70–110)
HCT VFR BLD AUTO: 41 % (ref 37–48.5)
HGB BLD-MCNC: 14 GM/DL (ref 12–16)
LYMPHOCYTES NFR BLD MANUAL: 69 % (ref 18–48)
MAGNESIUM SERPL-MCNC: 1.7 MG/DL (ref 1.6–2.6)
MCH RBC QN AUTO: 31.7 PG (ref 27–31)
MCHC RBC AUTO-ENTMCNC: 34.1 G/DL (ref 32–36)
MCV RBC AUTO: 93 FL (ref 82–98)
MONOCYTES NFR BLD MANUAL: 4 % (ref 4–15)
NEUTROPHILS NFR BLD MANUAL: 23 % (ref 38–73)
NEUTS BAND NFR BLD MANUAL: 1 %
NUCLEATED RBC (/100WBC) (OHS): 0 /100 WBC
PLATELET # BLD AUTO: 201 K/UL (ref 150–450)
PMV BLD AUTO: 9.7 FL (ref 9.2–12.9)
POTASSIUM SERPL-SCNC: 3.5 MMOL/L (ref 3.5–5.1)
PROT SERPL-MCNC: 7.3 GM/DL (ref 6–8.4)
RBC # BLD AUTO: 4.41 M/UL (ref 4–5.4)
SODIUM SERPL-SCNC: 143 MMOL/L (ref 136–145)
TSH SERPL-ACNC: 3.65 UIU/ML (ref 0.4–4)
WBC # BLD AUTO: 11.83 K/UL (ref 3.9–12.7)

## 2025-08-25 PROCEDURE — 85027 COMPLETE CBC AUTOMATED: CPT

## 2025-08-25 PROCEDURE — 82040 ASSAY OF SERUM ALBUMIN: CPT

## 2025-08-25 PROCEDURE — 84443 ASSAY THYROID STIM HORMONE: CPT

## 2025-08-25 PROCEDURE — 83735 ASSAY OF MAGNESIUM: CPT

## 2025-08-25 PROCEDURE — 36415 COLL VENOUS BLD VENIPUNCTURE: CPT

## 2025-08-25 PROCEDURE — 84479 ASSAY OF THYROID (T3 OR T4): CPT

## 2025-08-26 LAB — AR T3 UPTAKE: 1.2 TBI

## (undated) DEVICE — BRA POST SURGICAL WHT 40-42IN

## (undated) DEVICE — SUT MONOCRYL 4-0 PS-2

## (undated) DEVICE — DRESSING TRANS 4X10 TEGADERM

## (undated) DEVICE — SOL BETADINE 5%

## (undated) DEVICE — DRAPE STERI INSTRUMENT 1018

## (undated) DEVICE — SUT 2/0 30IN SILK BLK BRAI

## (undated) DEVICE — SYS CLSR DERMABOND PRINEO 22CM

## (undated) DEVICE — ELECTRODE REM PLYHSV RETURN 9

## (undated) DEVICE — TOWEL OR DISP STRL BLUE 4/PK

## (undated) DEVICE — SUT 3-0 ETHILON 18 FS-1

## (undated) DEVICE — TOWEL OR XRAY BLUE 17X26IN

## (undated) DEVICE — Device

## (undated) DEVICE — SUT PDS II 3-0 SH 36IN

## (undated) DEVICE — SPONGE LAP 18X18 PREWASHED

## (undated) DEVICE — GOWN SURGICAL X-LARGE

## (undated) DEVICE — STAPLER SKIN ROTATING HEAD

## (undated) DEVICE — BELLOW CANN HEMOBLAST 1.65GR

## (undated) DEVICE — PAD ABDOMINAL STERILE 8X10IN

## (undated) DEVICE — PENCIL ELECTROSURG HOLST W/BLD

## (undated) DEVICE — SYS CLSR DERMABOND PRINEO 42CM

## (undated) DEVICE — SOL VASHE INSTILLATION WND 16

## (undated) DEVICE — BANDAGE GAUZE COT STRL 4.5X4.1

## (undated) DEVICE — POSITIONER IV ARMBOARD FOAM

## (undated) DEVICE — GOWN NONREINF SET-IN SLV XL

## (undated) DEVICE — UNDERGLOVES BIOGEL PI SZ 7 LF

## (undated) DEVICE — APPLIER CLIP LIAGCLIP 9.375IN

## (undated) DEVICE — NDL ECLIPSE SAFETY 23G 1.5IN

## (undated) DEVICE — SUT ETHILON 5-0 PS-2 18IN

## (undated) DEVICE — ELECTRODE BLADE TEFLON 6

## (undated) DEVICE — UNDERGLOVE BIOGEL PI SZ 6.5 LF

## (undated) DEVICE — SYR ONLY LUER LOCK 20CC

## (undated) DEVICE — EVACUATOR WOUND BULB 100CC

## (undated) DEVICE — WAVEGUIDE BRITEFIELD DISP

## (undated) DEVICE — POSITIONER HEEL FOAM CONVOLTD

## (undated) DEVICE — MANIFOLD 4 PORT

## (undated) DEVICE — SUT PDS II 2-0 CT1

## (undated) DEVICE — SOL IRRI STRL WATER 1000ML

## (undated) DEVICE — DRESSING CHG FOAM 4MM 1IN

## (undated) DEVICE — HOLDER DRAIN POUCH PINK

## (undated) DEVICE — GLOVE BIOGEL SKINSENSE PI 6.5

## (undated) DEVICE — DRAPE THREE-QTR REINF 53X77IN

## (undated) DEVICE — SUT STRATAFIX PGAPCL 3 FS-2

## (undated) DEVICE — ELECTRODE BLD EXT INSUL 1

## (undated) DEVICE — SUT 2/0 27IN PDS II VIO MO

## (undated) DEVICE — APPLICATOR CHLORAPREP ORN 26ML

## (undated) DEVICE — GLOVE BIOGEL SKINSENSE PI 6.0

## (undated) DEVICE — SUT MCRYL PLUS 3-0 PS2 27IN

## (undated) DEVICE — COVER LIGHT HANDLE 80/CA

## (undated) DEVICE — SOL NORMAL USPCA 0.9%

## (undated) DEVICE — SUT MONOCRYL 3-0 PS-2 UND

## (undated) DEVICE — DEVICE PLASMABLADE X 3.0S LT

## (undated) DEVICE — TRAY CYSTO BASIN

## (undated) DEVICE — ADHESIVE DERMABOND ADVANCED

## (undated) DEVICE — SYR 10CC LUER LOCK

## (undated) DEVICE — GLOVE BIOGEL SKINSENSE PI 7.0

## (undated) DEVICE — CONTAINER SPEC OR STRL 4.5OZ

## (undated) DEVICE — SUT STRATAFIX PGAPCL 3 FS-1

## (undated) DEVICE — REMOVER STAPLE SKIN STERILE

## (undated) DEVICE — GOWN ORBIS LVL 4 XXL 49IN

## (undated) DEVICE — FUNNEL KELLER STERILE

## (undated) DEVICE — DRAIN CHANNEL ROUND 15FR

## (undated) DEVICE — MARKER FN REG DUAL UTIL RULER

## (undated) DEVICE — GLOVE SENSICARE PI ALOE 7

## (undated) DEVICE — TIP YANKAUERS BULB NO VENT

## (undated) DEVICE — SUT VICRYL 3-0 27 SH

## (undated) DEVICE — TRAY CATH 1-LYR URIMTR 16FR